# Patient Record
Sex: FEMALE | Race: WHITE | NOT HISPANIC OR LATINO | Employment: FULL TIME | URBAN - METROPOLITAN AREA
[De-identification: names, ages, dates, MRNs, and addresses within clinical notes are randomized per-mention and may not be internally consistent; named-entity substitution may affect disease eponyms.]

---
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
## 2023-02-28 ENCOUNTER — HOSPITAL ENCOUNTER (OUTPATIENT)
Dept: RADIOLOGY | Facility: MEDICAL CENTER | Age: 18
End: 2023-02-28
Attending: PHYSICIAN ASSISTANT
Payer: COMMERCIAL

## 2023-02-28 ENCOUNTER — OCCUPATIONAL MEDICINE (OUTPATIENT)
Dept: URGENT CARE | Facility: PHYSICIAN GROUP | Age: 18
End: 2023-02-28
Payer: COMMERCIAL

## 2023-02-28 VITALS
HEIGHT: 64 IN | RESPIRATION RATE: 16 BRPM | TEMPERATURE: 98.1 F | WEIGHT: 140 LBS | OXYGEN SATURATION: 98 % | DIASTOLIC BLOOD PRESSURE: 64 MMHG | HEART RATE: 86 BPM | SYSTOLIC BLOOD PRESSURE: 100 MMHG | BODY MASS INDEX: 23.9 KG/M2

## 2023-02-28 DIAGNOSIS — M25.531 RIGHT WRIST PAIN: ICD-10-CM

## 2023-02-28 DIAGNOSIS — S60.211A CONTUSION OF RIGHT WRIST, INITIAL ENCOUNTER: ICD-10-CM

## 2023-02-28 PROCEDURE — 73110 X-RAY EXAM OF WRIST: CPT | Mod: RT

## 2023-02-28 PROCEDURE — 99213 OFFICE O/P EST LOW 20 MIN: CPT | Performed by: PHYSICIAN ASSISTANT

## 2023-02-28 RX ORDER — IBUPROFEN 200 MG
600 TABLET ORAL ONCE
Status: COMPLETED | OUTPATIENT
Start: 2023-02-28 | End: 2023-02-28

## 2023-02-28 RX ADMIN — Medication 600 MG: at 17:43

## 2023-02-28 ASSESSMENT — FIBROSIS 4 INDEX: FIB4 SCORE: .3589594320875166869

## 2023-02-28 NOTE — LETTER
[facility] [address] [place] [address]  Phone:  [phone] - Fax:  [phone]   [facility] Progress Report and Disability Certification  Date of Service: 2/28/2023   No Show:  No  Date / Time of Next Visit: 4/4/2023 @8:30   Claim Information   Patient Name: Nancy Rodrigues  Claim Number:     Employer: [email or web address]  Date of Injury: 2/28/2023     Insurer / TPA: Yvette  ID / SSN:     Occupation: [employer]  Diagnosis: The encounter diagnosis was Contusion of right wrist, initial encounter.    Medical Information   Related to Industrial Injury? Yes    Subjective Complaints:  DOI: 2/28/23  Initial visit   NIRMAL: Patient states she was pushing a cart full of supplies and accidentally smashed her right wrist against the cart and  a shelf.  Developed immediate pain afterwards.  There is a small scrape to her wrist.  Pain is worse with movement of her wrist and gripping.  Denies any numbness or tingling. Denies a previous right wrist injury. Denies a second job.    Objective Findings: Constitutional: Well-appearing in no acute distress   Musculoskeletal: Right wrist - full ROM.  There is erythema and localized edema to the dorsal wrist.  Superficial abrasion.  Tenderness to the dorsal wrist.  Negative snuffbox tenderness.  Radial and ulnar pulse 2+.  Sensation intact.  Cap refills less than 2 seconds.   Pre-Existing Condition(s):     Assessment:   Initial Visit    Status: Additional Care Required  Permanent Disability:No    Plan: Diagnostics    Diagnostics: X-ray    Comments:  Plan:  -Wrist brace, elevation, ice application, ibuprofen.  Gentle increased range of motion and gripping exercises as tolerated.  - Work restrictions per D39  -Follow-up in 1 week.      Disability Information   Status: Released to Restricted Duty    From:  2/28/2023  Through: 4/4/2023 Restrictions are: Temporary   Physical Restrictions   Sitting:    Standing:     Stooping:    Bending:      Squatting:    Walking:    Climbing:    Pushing:      Pulling:    Other:    Reaching Above Shoulder (L):   Reaching Above Shoulder (R):       Reaching Below Shoulder (L):    Reaching Below Shoulder (R):      Not to exceed Weight Limits   Carrying(hrs):   Weight Limit(lb):   Lifting(hrs):   Weight  Limit(lb):     Comments: No lifting, gripping, pulling pushing more than 10 lbs with right hand only     Repetitive Actions   Hands: i.e. Fine Manipulations from Grasping:     Feet: i.e. Operating Foot Controls:     Driving / Operate Machinery:     Health Care Provider’s Original or Electronic Signature  Yosi Mondragon P.A.-C. Health Care Provider’s Original or Electronic Signature    Gigi Real DO MPH     Clinic Name / Location: [address] [address] Clinic Phone Number: Dept: [phone]   Appointment Time: 5:05 Pm Visit Start Time: 5:13 PM   Check-In Time:  5:07 Pm Visit Discharge Time:  7:05 PM   Original-Treating Physician or Chiropractor    Page 2-Insurer/TPA    Page 3-Employer    Page 4-Employee

## 2023-02-28 NOTE — LETTER
"EMPLOYEE’S CLAIM FOR COMPENSATION/ REPORT OF INITIAL TREATMENT  FORM C-4    EMPLOYEE’S CLAIM - PROVIDE ALL INFORMATION REQUESTED   First Name  Nancy Last Name  Ravi Birthdate                    2005                Sex  female Claim Number (Insurer’s Use Only)   Home Address  [address]  Age  17 y.o. 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 (if applicable)  pushing a picking cart    How did this injury or occupational disease occur? (Be specific an answer in detail. Use additional sheet if necessary)  I was pushing a caart and wasnt watchign where my hand was while I was moving the cart out of someones way. I ened up smashing my hand/wrist between the cart and shelf   If you believe that you have an occupational disease, when did you first have knowledge of the disability and it relationship to your employment?  n/a  n/a Witnesses to the Accident  n/a      Nature of Injury or Occupational Disease  Crushing  Part(s) of Body Injured or Affected  Wrist (R) and Hand (R), N/A, N/A    I certify that the above is true and " correct to the best of my knowledge and that I have provided this information in order to obtain the benefits of [place] of [place]).  I hereby authorize any physician, chiropractor, surgeon, practitioner, or other person, any hospital, including [facility] or [facility], any medical service organization, any insurance company, or other institution or organization to release to each other, any medical or other information, including benefits paid or payable, pertinent to this injury or disease, except information relative to diagnosis, treatment and/or counseling for AIDS, psychological conditions, alcohol or controlled substances, for which I must give specific authorization.  A Photostat of this authorization shall be as valid as the original.     Date   Place Employee’s Original or  *Electronic Signature   THIS REPORT MUST BE COMPLETED AND MAILED WITHIN 3 WORKING DAYS OF TREATMENT   Place  [facility]  Name of Facility  [facility]   Date  2/28/2023 Diagnosis and Description of Injury or Occupational Disease  (S60.211A) Contusion of right wrist, initial encounter Is there evidence the injured employee was under the influence of alcohol and/or another controlled substance at the time of accident?  ? No ? Yes (if yes, please explain)   Hour  5:13 PM   The encounter diagnosis was Contusion of right wrist, initial encounter. No   Treatment  -Wrist brace, elevation, ice application, ibuprofen.  Gentle increased range of motion and gripping exercises as tolerated.  - Work restrictions per D39  -Follow-up in 1 week.    Have you advised the patient to remain off work five days or     more?    X-Ray Findings      ? Yes Indicate dates:   From   To      From information given by the employee, together with medical evidence, can        you directly connect this injury or occupational disease as  "job incurred?  Yes ? No If no, is the injured employee capable of:  ? full duty  No ? modified duty  Yes   Is additional medical care by a physician indicated?  Yes If Modified Duty, Specify any Limitations / Restrictions  Any duty following work restrictions per D39    Do you know of any previous injury or disease contributing to this condition or occupational disease?  ? Yes ? No (Explain if yes)                          No   Date  2/28/2023 Print Health Care Provider's   Ministerio Mondragon P.A.-C. I certify the employer’s copy of  this form was mailed on:   Address  [address]  [facility] Insurer’s Use Only     [facility]  [address]    Provider’s Tax ID Number  [id number] Telephone  Dept: [phone]             Health Care Provider’s Original or Electronic Signature  e-MINISTERIO Qureshi P.A.-C. Degree (MD,DO, DC,PAHeberC,APRN)        * Complete and attach Release of Information (Form C-4A) when injured employee signs C-4 Form electronically  ORIGINAL - TREATING HEALTHCARE PROVIDER PAGE 2 - INSURER/TPA PAGE 3 - EMPLOYER PAGE 4 - EMPLOYEE             Form C-4 (rev.08/21)           BRIEF DESCRIPTION OF RIGHTS AND BENEFITS  (Pursuant to [place] 616C.050)    Notice of Injury or Occupational Disease (Incident Report Form C-1): If an injury or occupational disease (OD) arises out of and in the course of employment, you must provide written notice to your employer as soon as practicable, but no later than 7 days after the accident or OD. Your employer shall maintain a sufficient supply of the required forms.    Claim for Compensation (Form C-4): If medical treatment is sought, the form C-4 is available at the place of initial treatment. A completed \"Claim for Compensation\" (Form C-4) must be filed within 90 days after an accident or OD. The treating physician or chiropractor must, within 3 working days after treatment, complete and mail to the employer, the employer's insurer and third-party , the " Claim for Compensation.    Medical Treatment: If you require medical treatment for your on-the-job injury or OD, you may be required to select a physician or chiropractor from a list provided by your workers’ compensation insurer, if it has contracted with an Organization for Managed Care (MCO) or Preferred Provider Organization (PPO) or providers of health care. If your employer has not entered into a contract with an MCO or PPO, you may select a physician or chiropractor from the Panel of Physicians and Chiropractors. Any medical costs related to your industrial injury or OD will be paid by your insurer.    Temporary Total Disability (TTD): If your doctor has certified that you are unable to work for a period of at least 5 consecutive days, or 5 cumulative days in a 20-day period, or places restrictions on you that your employer does not accommodate, you may be entitled to TTD compensation.    Temporary Partial Disability (TPD): If the wage you receive upon reemployment is less than the compensation for TTD to which you are entitled, the insurer may be required to pay you TPD compensation to make up the difference. TPD can only be paid for a maximum of 24 months.    Permanent Partial Disability (PPD): When your medical condition is stable and there is an indication of a PPD as a result of your injury or OD, within 30 days, your insurer must arrange for an evaluation by a rating physician or chiropractor to determine the degree of your PPD. The amount of your PPD award depends on the date of injury, the results of the PPD evaluation, your age and wage.    Permanent Total Disability (PTD): If you are medically certified by a treating physician or chiropractor as permanently and totally disabled and have been granted a PTD status by your insurer, you are entitled to receive monthly benefits not to exceed 66 2/3% of your average monthly wage. The amount of your PTD payments is subject to reduction if you previously  received a lump-sum PPD award.    Vocational Rehabilitation Services: You may be eligible for vocational rehabilitation services if you are unable to return to the job due to a permanent physical impairment or permanent restrictions as a result of your injury or occupational disease.    Transportation and Per Javier Reimbursement: You may be eligible for travel expenses and per javier associated with medical treatment.    Reopening: You may be able to reopen your claim if your condition worsens after claim closure.     Appeal Process: If you disagree with a written determination issued by the insurer or the insurer does not respond to your request, you may appeal to the Department of Administration, , by following the instructions contained in your determination letter. You must appeal the determination within 70 days from the date of the determination letter at [address], [address], [place], [place] [address], or [address], [address], [place] [address]. If you disagree with the  decision, you may appeal to the Department of Administration, . You must file your appeal within 30 days from the date of the  decision letter at [address], [address], [place], [place] [address], or [address], [address], [place], [place] [address]. If you disagree with a decision of an , you may file a petition for judicial review with the District Court. You must do so within 30 days of the Appeal Officer’s decision. You may be represented by an  at your own expense or you may contact the [facility] for possible representation.    [place]  for Injured Workers (NAIW): If you disagree with a  decision, you may request that NAIW represent you without charge at an  Hearing. For information regarding denial of benefits, you may contact the [facility] at: [address], [address],  [place] [address], [phone], or [address], [address], [place] [address], [phone]    To File a Complaint with the Division: If you wish to file a complaint with the  of the Division of Industrial Relations (DIR),  please contact the Workers’ Compensation Section, [address], [address], [place], [place] [address], telephone [phone], or [address], [address], [place], [place] [address], telephone [phone]) [phone].    For assistance with Workers’ Compensation Issues: You may contact the [facility] Office for Consumer Health Assistance, [address], [facility] [address], [place] [address], Toll Free 1-655.122.5166, Web site: [email or web address] E-mail: [email or web address]              __________________________________________________________________                                    _________________            Employee Name / Signature                                                                                                                            Date                                                                                                                                                                                                                              D-2 (rev. 10/20)

## 2023-03-01 NOTE — PROGRESS NOTES
"Subjective:     Nancy Rodrigues is a 17 y.o. female who presents for Wrist Injury (NEW WC DOI 2/28/2023 (R) wrist)      DOI: 2/28/23  Initial visit   NIRMAL: Patient states she was pushing a cart full of supplies and accidentally smashed her right wrist against the cart and  a shelf.  Developed immediate pain afterwards.  There is a small scrape to her wrist.  Pain is worse with movement of her wrist and gripping.  Denies any numbness or tingling. Denies a previous right wrist injury. Denies a second job.     PMH:   No pertinent past medical history to this problem  MEDS:  Medications were reviewed in EMR  ALLERGIES:  Allergies were reviewed in EMR  SOCHX:  Works as a Production [employer]   FH:   No pertinent family history to this problem       Objective:     /64 (BP Location: Left arm, Patient Position: Sitting, BP Cuff Size: Adult)   Pulse 86   Temp 36.7 °C (98.1 °F) (Temporal)   Resp 16   Ht 1.626 m (5' 4\")   Wt 63.5 kg (140 lb)   SpO2 98%   BMI 24.03 kg/m²     Constitutional: Well-appearing in no acute distress   Musculoskeletal: Right wrist - full ROM.  There is erythema and localized edema to the dorsal wrist.  Superficial abrasion.  Tenderness to the dorsal wrist.  Negative snuffbox tenderness.  Radial and ulnar pulse 2+.  Sensation intact.  Cap refills less than 2 seconds.      RADIOLOGY RESULTS   DX-WRIST-COMPLETE 3+ RIGHT    Result Date: 2/28/2023 2/28/2023 5:49 PM HISTORY/REASON FOR EXAM:  Pain/Deformity Following Trauma. Erythema and swelling after crush injury TECHNIQUE/EXAM DESCRIPTION AND NUMBER OF VIEWS:  4 views of the RIGHT wrist. COMPARISON: None FINDINGS: There is some soft tissue swelling without abnormal gas or foreign body No acute fracture or subluxation is seen. Carpal relationships are normal Ulnar positive variance without evidence of lunate injury     No radiographic evidence of acute bony injury. Ulnar positive variance           Assessment/Plan:       1. Contusion of right wrist, " initial encounter  - DX-WRIST-COMPLETE 3+ RIGHT; Future  - ibuprofen (MOTRIN) tablet 600 mg    Released to Restricted Duty FROM 2/28/2023 TO 4/4/2023  No lifting, gripping, pulling pushing more than 10 lbs with right hand only   Plan:  -Wrist brace, elevation, ice application, ibuprofen.  Gentle increased range of motion and gripping exercises as tolerated.  - Work restrictions per D39  -Follow-up in 1 week.    X-ray results per radiologist interpretation above. I personally reviewed images and radiologist report   Differential diagnosis, natural history, supportive care, and indications for immediate follow-up discussed.

## 2023-03-04 ENCOUNTER — OCCUPATIONAL MEDICINE (OUTPATIENT)
Dept: URGENT CARE | Facility: PHYSICIAN GROUP | Age: 18
End: 2023-03-04
Payer: COMMERCIAL

## 2023-03-04 VITALS
OXYGEN SATURATION: 96 % | DIASTOLIC BLOOD PRESSURE: 68 MMHG | SYSTOLIC BLOOD PRESSURE: 110 MMHG | BODY MASS INDEX: 24.37 KG/M2 | HEART RATE: 90 BPM | TEMPERATURE: 97.6 F | RESPIRATION RATE: 16 BRPM | WEIGHT: 142 LBS

## 2023-03-04 DIAGNOSIS — S60.211D CONTUSION OF RIGHT WRIST, SUBSEQUENT ENCOUNTER: ICD-10-CM

## 2023-03-04 PROCEDURE — 99213 OFFICE O/P EST LOW 20 MIN: CPT | Performed by: NURSE PRACTITIONER

## 2023-03-04 ASSESSMENT — FIBROSIS 4 INDEX: FIB4 SCORE: .3589594320875166869

## 2023-03-04 ASSESSMENT — ENCOUNTER SYMPTOMS
FALLS: 0
SENSORY CHANGE: 0
MYALGIAS: 1
TINGLING: 0

## 2023-03-04 NOTE — LETTER
[facility] [address] [address]  Phone:  [phone] - Fax:  [phone]   [facility] Progress Report and Disability Certification  Date of Service: 3/4/2023   No Show:  No  Date / Time of Next Visit: 3/11/2023   Claim Information   Patient Name: Nancy Rodrigues  Claim Number:     Employer: [employer]  Date of Injury: 2/28/2023     Insurer / TPA: [employer]  ID / SSN:     Occupation: Production II  Diagnosis: The encounter diagnosis was Contusion of right wrist, subsequent encounter.    Medical Information   Related to Industrial Injury? Yes    Subjective Complaints:  DOI: 2/28/23  Initial visit   NIRMAL: Patient states she was pushing a cart full of supplies and accidentally smashed her right wrist against the cart and  a shelf.  Developed immediate pain afterwards.  There is a small scrape to her right wrist.  Pain is worse with movement of her wrist and gripping.  Denies any numbness or tingling. Denies a previous right wrist injury. Denies a second job.     Today, 3/4/2023, 2nd encounter. States mild improvement in right wrist pain.  Has been using wrist splint at work always.  Having discomfort at dorsal aspect of mid wrist joint with movement still.  Abrasion did bleed last night but has covered with a Band-Aid.  Taking ibuprofen as needed for pain.  No ice as needed as well.  No noted numbness/tingling or weakness in her strength.  Performing modified duties with no problems.   Objective Findings: A/O x 3, skin p/w/d, skin sensation intact. Vitals WNL.  Full range of motion with flexion and extension of right wrist joint with mild discomfort at dorsal aspect.  No swelling, erythema, bruising, deformity at wrist joint.  Abrasion at mid dorsal aspect wrist joint without active bleeding, swelling or drainage from site.  Tenderness on palpation at this region as well.   Pre-Existing Condition(s):     Assessment:   Condition  Improved    Status: Additional Care Required  Permanent Disability:No    Plan:      Diagnostics:      Comments:       Disability Information   Status: Released to Restricted Duty    From:  3/4/2023  Through: 3/11/2023 Restrictions are:     Physical Restrictions   Sitting:    Standing:    Stooping:    Bending:      Squatting:    Walking:    Climbing:    Pushing:      Pulling:    Other:    Reaching Above Shoulder (L):   Reaching Above Shoulder (R):       Reaching Below Shoulder (L):    Reaching Below Shoulder (R):      Not to exceed Weight Limits   Carrying(hrs):   Weight Limit(lb):   Lifting(hrs):   Weight  Limit(lb):     Comments: -We will continue with current job restrictions of no lifting, gripping, pulling, pushing > 10 pounds  -Must use Velcro wrist splint always at work  -May continue with ibuprofen as needed for pain  -May use cool compress for any throbbing pain or swelling as needed  -May use warm compress for any joint stiffness as needed  -May continue to do gentle range of motion exercises to prevent joint stiffness as tolerated  -Recheck in 1 week      Repetitive Actions   Hands: i.e. Fine Manipulations from Grasping:     Feet: i.e. Operating Foot Controls:     Driving / Operate Machinery:     Health Care Provider’s Original or Electronic Signature  ASIF Null. Health Care Provider’s Original or Electronic Signature    Gigi Rael DO MPH     Clinic Name / Location: [address] [address] Clinic Phone Number: Dept: [phone]   Appointment Time: 9:15 Am Visit Start Time: 9:57 AM   Check-In Time:  9:35 Am Visit Discharge Time:     Original-Treating Physician or Chiropractor    Page 2-Insurer/TPA    Page 3-Employer    Page 4-Employee

## 2023-03-04 NOTE — PROGRESS NOTES
Subjective     Nancy Rodrigues is a 17 y.o. female who presents with Follow-Up (Workman's comp)      DOI: 2/28/23  Initial visit   NIRMAL: Patient states she was pushing a cart full of supplies and accidentally smashed her right wrist against the cart and  a shelf.  Developed immediate pain afterwards.  There is a small scrape to her right wrist.  Pain is worse with movement of her wrist and gripping.  Denies any numbness or tingling. Denies a previous right wrist injury. Denies a second job.     Today, 3/4/2023, 2nd encounter. States mild improvement in right wrist pain.  Has been using wrist splint at work always.  Having discomfort at dorsal aspect of mid wrist joint with movement still.  Abrasion did bleed last night but has covered with a Band-Aid.  Taking ibuprofen as needed for pain.  No ice as needed as well.  No noted numbness/tingling or weakness in her strength.  Performing modified duties with no problems.     HPI  PMH: No pertinent past medical history to this problem  MEDS: Medications were reviewed in Epic  ALLERGIES: Allergies were reviewed in Epic  FH: No pertinent family history to this problem       Review of Systems   Musculoskeletal:  Positive for joint pain and myalgias. Negative for falls.   Neurological:  Negative for tingling and sensory change.   All other systems reviewed and are negative.           Objective     /68 (BP Location: Left arm, Patient Position: Sitting, BP Cuff Size: Adult)   Pulse 90   Temp 36.4 °C (97.6 °F) (Temporal)   Resp 16   Wt 64.4 kg (142 lb)   SpO2 96%   BMI 24.37 kg/m²      Physical Exam  Vitals reviewed.   Constitutional:       General: She is awake. She is not in acute distress.     Appearance: Normal appearance. She is well-developed. She is not ill-appearing, toxic-appearing or diaphoretic.   Cardiovascular:      Rate and Rhythm: Normal rate.   Pulmonary:      Effort: Pulmonary effort is normal.   Musculoskeletal:      Right forearm: Normal.       Right wrist: Tenderness present. No swelling, deformity, effusion, lacerations, bony tenderness, snuff box tenderness or crepitus. Normal range of motion. Normal pulse.      Right hand: Normal.   Skin:     General: Skin is warm and dry.      Findings: Abrasion and erythema present.   Neurological:      Mental Status: She is alert and oriented to person, place, and time.   Psychiatric:         Attention and Perception: Attention normal.         Mood and Affect: Mood normal.         Speech: Speech normal.         Behavior: Behavior normal. Behavior is cooperative.       A/O x 3, skin p/w/d, skin sensation intact. Vitals WNL.  Full range of motion with flexion and extension of right wrist joint with mild discomfort at dorsal aspect.  No swelling, erythema, bruising, deformity at wrist joint.  Abrasion at mid dorsal aspect wrist joint without active bleeding, swelling or drainage from site.  Tenderness on palpation at this region as well.                   Assessment & Plan        1. Contusion of right wrist, subsequent encounter       -We will continue with current job restrictions of no lifting, gripping, pulling, pushing > 10 pounds   -Must use Velcro wrist splint always at work   -May continue with ibuprofen as needed for pain   -May use cool compress for any throbbing pain or swelling as needed   -May use warm compress for any joint stiffness as needed   -May continue to do gentle range of motion exercises to prevent joint stiffness as tolerated   -Recheck in 1 week

## 2023-03-07 ENCOUNTER — OCCUPATIONAL MEDICINE (OUTPATIENT)
Dept: URGENT CARE | Facility: PHYSICIAN GROUP | Age: 18
End: 2023-03-07
Payer: COMMERCIAL

## 2023-03-07 VITALS
WEIGHT: 142 LBS | RESPIRATION RATE: 18 BRPM | HEIGHT: 64 IN | SYSTOLIC BLOOD PRESSURE: 96 MMHG | BODY MASS INDEX: 24.24 KG/M2 | OXYGEN SATURATION: 96 % | DIASTOLIC BLOOD PRESSURE: 60 MMHG | TEMPERATURE: 97.8 F | HEART RATE: 94 BPM

## 2023-03-07 DIAGNOSIS — S60.211D CONTUSION OF RIGHT WRIST, SUBSEQUENT ENCOUNTER: ICD-10-CM

## 2023-03-07 DIAGNOSIS — Z02.6 ENCOUNTER RELATED TO WORKER'S COMPENSATION CLAIM: ICD-10-CM

## 2023-03-07 PROCEDURE — 99213 OFFICE O/P EST LOW 20 MIN: CPT | Performed by: PHYSICIAN ASSISTANT

## 2023-03-07 ASSESSMENT — FIBROSIS 4 INDEX: FIB4 SCORE: .3589594320875166869

## 2023-03-07 NOTE — LETTER
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    Medical Information   Related to Industrial Injury? Yes    Subjective Complaints:  DOI: 2/28/23  Initial visit   NIRMAL: Patient states she was pushing a cart full of supplies and accidentally smashed her right wrist against the cart and  a shelf.  Developed immediate pain afterwards.  There is a small scrape to her right wrist.  Pain is worse with movement of her wrist and gripping.  Denies any numbness or tingling. Denies a previous right wrist injury. Denies a second job.      Update 3/7/2023: Third encounter. Intermittently painful at times with associated swelling. States only mild improvement in right wrist pain.  Not tolerating modified duty.  Does get swollen with use and also with rest.  During times of swelling wrist range of motion is diminished.  Has been using wrist splint at work always.  Having discomfort at dorsal aspect of mid wrist joint with movement still.  Taking ibuprofen as needed for pain.  Using ice as needed.     Objective Findings: A/O x 3, skin p/w/d, skin sensation intact. Vitals WNL.  Full range of motion with flexion and extension of right wrist joint with mild discomfort at dorsal aspect at DRUJ.  Mild swelling, no erythema, or bruising, no deformity at wrist joint.  Abrasion at mid dorsal aspect wrist joint without active bleeding, swelling or  drainage from site.  Tenderness on palpation at this region as well.  [place]i.   Pre-Existing Condition(s):     Assessment:   Condition Worsened    Status: Discharged / Care Transfer  Permanent Disability:No    Plan: MedicationTransfer Care  Comments:Ibuprofen prn swelling    Diagnostics:      Comments:    RADIOLOGY RESULTS  DX-WRIST-COMPLETE 3+ RIGHT    Result Date: 2023 5:49 PM HISTORY/REASON FOR EXAM:  Pain/Deformity Following Trauma. Erythema and swelling after crush injury TECHNIQUE/EXAM DESCRIPTION AND NUMBER OF VIEWS:  4 views of the RIGHT wrist. COMPARISON: None FINDINGS: There is some soft tissue swelling without abnormal gas or foreign body No acute fracture or subluxation is seen. Carpal relationships are normal Ulnar positive variance without evidence of lunate injury     No radiographic evidence of acute bony injury. Ulnar positive variance              Disability Information   Status: Released to Restricted Duty    From:  3/7/2023  Through: 3/14/2023 Restrictions are: Temporary   Physical Restrictions   Sitting:    Standing:    Stooping:    Bending:      Squatting:    Walking:    Climbing:    Pushin hrs/day  Comments:RUE   Pullin hrs/day  Comments:RUE Other:    Reaching Above Shoulder (L):   Reaching Above Shoulder (R):       Reaching Below Shoulder (L):    Reaching Below Shoulder (R):      Not to exceed Weight Limits   Carrying(hrs):   Weight Limit(lb):   Lifting(hrs):   Weight  Limit(lb):     Comments: -May not use right hand while at work for any job duties.  No lifting, gripping, pulling, pushing with RUE.  -Must use Velcro wrist splint always at work   -May continue with ibuprofen as needed for pain   -May use cool compress for any throbbing pain or swelling as needed   -May use warm compress for any joint stiffness as needed   -May continue to do gentle range of motion exercises to prevent joint stiffness as tolerated   -Transfer care to occupational medicine for further  evaluation    Repetitive Actions   Hands: i.e. Fine Manipulations from Graspin hrs/day  Comments:RUE   Feet: i.e. Operating Foot Controls:     Driving / Operate Machinery:     Health Care Provider’s Original or Electronic Signature  Misty Winter P.A.-C. Health Care Provider’s Original or Electronic Signature    Gigi Real DO MPH     Clinic Name / Location: [address] [address] Clinic Phone Number: Dept: [phone]   Appointment Time: 12:00 Pm Visit Start Time: 12:05 PM   Check-In Time:  11:57 Am Visit Discharge Time:     Original-Treating Physician or Chiropractor    Page 2-Insurer/TPA    Page 3-Employer    Page 4-Employee

## 2023-03-07 NOTE — PROGRESS NOTES
"Subjective     Nancy Rodrigues is a 17 y.o. female who presents with Wrist Injury ( follow up 1/R wrist injury/Pain and swelling worsened last night, scheduled appt earlier per employer/)      DOI: 2/28/23  Initial visit   NIRMAL: Patient states she was pushing a cart full of supplies and accidentally smashed her right wrist against the cart and  a shelf.  Developed immediate pain afterwards.  There is a small scrape to her right wrist.  Pain is worse with movement of her wrist and gripping.  Denies any numbness or tingling. Denies a previous right wrist injury. Denies a second job.      Update 3/7/2023: Third encounter. Intermittently painful at times with associated swelling. States only mild improvement in right wrist pain.  Not tolerating modified duty.  Does get swollen with use and also with rest.  During times of swelling wrist range of motion is diminished.  Has been using wrist splint at work always.  Having discomfort at dorsal aspect of mid wrist joint with movement still.  Taking ibuprofen as needed for pain.  Using ice as needed.                Objective     BP 96/60 (BP Location: Right arm, Patient Position: Sitting, BP Cuff Size: Adult long)   Pulse 94   Temp 36.6 °C (97.8 °F) (Temporal)   Resp 18   Ht 1.626 m (5' 4\")   Wt 64.4 kg (142 lb)   SpO2 96%   BMI 24.37 kg/m²      Physical Exam  Vitals and nursing note reviewed.   Constitutional:       General: She is not in acute distress.     Appearance: Normal appearance. She is not ill-appearing, toxic-appearing or diaphoretic.   HENT:      Head: Normocephalic.      Right Ear: External ear normal.      Left Ear: External ear normal.      Nose: Nose normal.      Mouth/Throat:      Mouth: Mucous membranes are moist.   Eyes:      Extraocular Movements: Extraocular movements intact.      Conjunctiva/sclera: Conjunctivae normal.      Pupils: Pupils are equal, round, and reactive to light.   Cardiovascular:      Rate and Rhythm: Normal rate.      " Pulses: Normal pulses.   Pulmonary:      Effort: Pulmonary effort is normal. No respiratory distress.   Musculoskeletal:      Cervical back: Normal range of motion.   Skin:     General: Skin is warm.      Findings: No lesion or rash.   Neurological:      General: No focal deficit present.      Mental Status: She is alert and oriented to person, place, and time.   Psychiatric:         Thought Content: Thought content normal.         Judgment: Judgment normal.       A/O x 3, skin p/w/d, skin sensation intact. Vitals WNL.  Full range of motion with flexion and extension of right wrist joint with mild discomfort at dorsal aspect.  No swelling, erythema, bruising, deformity at wrist joint.  Abrasion at mid dorsal aspect wrist joint without active bleeding, swelling or drainage from site.  Tenderness on palpation at this region as well.                   Assessment & Plan        1. Contusion of right wrist, subsequent encounter    - Referral to Occupational Medicine    2. Encounter related to worker's compensation claim    - Referral to Occupational Medicine     -May not use right hand while at work for any job duties.  No lifting, gripping, pulling, pushing with RUE.  -Must use Velcro wrist splint always at work   -May continue with ibuprofen as needed for pain   -May use cool compress for any throbbing pain or swelling as needed   -May use warm compress for any joint stiffness as needed   -May continue to do gentle range of motion exercises to prevent joint stiffness as tolerated   -Transfer care to occupational medicine for further evaluation

## 2023-03-14 ENCOUNTER — OCCUPATIONAL MEDICINE (OUTPATIENT)
Dept: OCCUPATIONAL MEDICINE | Facility: CLINIC | Age: 18
End: 2023-03-14
Payer: COMMERCIAL

## 2023-03-14 VITALS
BODY MASS INDEX: 24.24 KG/M2 | WEIGHT: 142 LBS | SYSTOLIC BLOOD PRESSURE: 128 MMHG | OXYGEN SATURATION: 100 % | RESPIRATION RATE: 14 BRPM | TEMPERATURE: 98.6 F | DIASTOLIC BLOOD PRESSURE: 84 MMHG | HEART RATE: 110 BPM | HEIGHT: 64 IN

## 2023-03-14 DIAGNOSIS — S60.211D CONTUSION OF RIGHT WRIST, SUBSEQUENT ENCOUNTER: ICD-10-CM

## 2023-03-14 PROCEDURE — 99213 OFFICE O/P EST LOW 20 MIN: CPT | Performed by: NURSE PRACTITIONER

## 2023-03-14 ASSESSMENT — FIBROSIS 4 INDEX: FIB4 SCORE: .3589594320875166869

## 2023-03-14 NOTE — LETTER
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    Medical Information   Related to Industrial Injury? Yes    Subjective Complaints:  DOI: 2/28/23. NIRMAL: Patient states she was pushing a cart full of supplies and accidentally smashed her right wrist against the cart and  a shelf.  Patient states that symptoms have improved. She has noticed increase swelling from picking up trash at work causing her to ice while she is at work, otherwise no new or worsening symptoms. She states that there is soreness in the joint with flexion. She denies numbness, tingling, or weakness. She has been taking Tylenol once a day if needed. She is using her wrist splint at work and doing stretching exercises which seem to be helping. She has been able to tolerate light duty. Hand therapy referral placed. Plan of care discussed with patient.    Objective Findings: Right wrist: FROM with flexion and extension of right wrist joint with mild discomfort at dorsal aspect.  No swelling, erythema, bruising, deformity at wrist joint.  Well-healed abrasion at mid dorsal aspect wrist joint no swelling, abnormal warmth, or drainage from site.  Mild tenderness on palpation at this region as well. Neg pain with pronation or supination.    Pre-Existing Condition(s):     Assessment:   Condition Improved    Status: Additional Care Required  Permanent Disability:No    Plan: OT    Diagnostics:      Comments:  Follow-up in 2  weeks  Work restrictions  Hand therapy referral placed  Continue with OTC Tylenol/Ibuprofen, Epson salt soaks, and OTC topical ointments ie Bio-Freeze, Tiger-Balm, or Voltaren Gel  Begin weaning the wrist brace   Continue RICE         Disability Information   Status: Released to Restricted Duty    From:  3/14/2023  Through: 3/28/2023 Restrictions are: Temporary   Physical Restrictions   Sitting:    Standing:    Stooping:    Bending:      Squatting:    Walking:    Climbing:    Pushing:  < or = to 1 hr/day   Pulling:  < or = to 1 hr/day Other:    Reaching Above Shoulder (L):   Reaching Above Shoulder (R):       Reaching Below Shoulder (L):    Reaching Below Shoulder (R):      Not to exceed Weight Limits   Carrying(hrs):   Weight Limit(lb): < or = to 10 pounds Lifting(hrs):   Weight  Limit(lb): < or = to 10 pounds   Comments: No pushing, pulling, or lifting more than 10lbs right extremity only. Recommend use of wrist splint if needed, otherwise begin weaning    Repetitive Actions   Hands: i.e. Fine Manipulations from Grasping: < or = to 1 hr/day   Feet: i.e. Operating Foot Controls:     Driving / Operate Machinery:     Health Care Provider’s Original or Electronic Signature  MODESTA Haley Health Care Provider’s Original or Electronic Signature    Gigi Real DO MPH     Clinic Name / Location: [address],   [address]  [place] [address] Clinic Phone Number: Dept: [phone]   Appointment Time: 3:30 Pm Visit Start Time: 3:27 PM   Check-In Time:  3:23 Pm Visit Discharge Time:  3:51 PM   Original-Treating Physician or Chiropractor    Page 2-Insurer/TPA    Page 3-Employer    Page 4-Employee

## 2023-03-14 NOTE — PROGRESS NOTES
"Subjective:     Nancy Rodrigues is a 17 y.o. female who presents for Follow-Up (BETTER - RM 16/)      DOI: 2/28/23. NIRMAL: Patient states she was pushing a cart full of supplies and accidentally smashed her right wrist against the cart and  a shelf.  Patient states that symptoms have improved. She has noticed increase swelling from picking up trash at work causing her to ice while she is at work, otherwise no new or worsening symptoms. She states that there is soreness in the joint with flexion. She denies numbness, tingling, or weakness. She has been taking Tylenol once a day if needed. She is using her wrist splint at work and doing stretching exercises which seem to be helping. She has been able to tolerate light duty. Hand therapy referral placed. Plan of care discussed with patient.     ROS: All systems were reviewed on intake form, form was reviewed and signed. See scanned documents in media. Pertinent positives and negatives included in HPI.    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   FH: No pertinent family history to this problem       Objective:     /84   Pulse (!) 110   Temp 37 °C (98.6 °F) (Temporal)   Resp 14   Ht 1.626 m (5' 4\")   Wt 64.4 kg (142 lb)   SpO2 100%   BMI 24.37 kg/m²     [unfilled]    Right wrist: FROM with flexion and extension of right wrist joint with mild discomfort at dorsal aspect.  No swelling, erythema, bruising, deformity at wrist joint.  Well-healed abrasion at mid dorsal aspect wrist joint no swelling, abnormal warmth, or drainage from site.  Mild tenderness on palpation at this region as well. Neg pain with pronation or supination.     Assessment/Plan:       1. Contusion of right wrist, subsequent encounter    Released to Restricted Duty FROM 3/14/2023 " TO 3/28/2023  No pushing, pulling, or lifting more than 10lbs right extremity only. Recommend use of wrist splint if needed, otherwise begin weaning  Follow-up in 2 weeks  Work restrictions  Hand therapy referral placed  Continue with OTC Tylenol/Ibuprofen, Epson salt soaks, and OTC topical ointments ie Bio-Freeze, Tiger-Balm, or Voltaren Gel  Begin weaning the wrist brace   Continue RICE         Differential diagnosis, natural history, supportive care, and indications for immediate follow-up discussed.    Approximately 25 minutes were spent in reviewing notes, preparing for visit, obtaining history, exam and evaluation, patient counseling/education and post visit documentation/orders.

## 2023-03-23 ENCOUNTER — OFFICE VISIT (OUTPATIENT)
Dept: MEDICAL GROUP | Facility: PHYSICIAN GROUP | Age: 18
End: 2023-03-23
Payer: COMMERCIAL

## 2023-03-23 VITALS
SYSTOLIC BLOOD PRESSURE: 114 MMHG | RESPIRATION RATE: 18 BRPM | HEIGHT: 64 IN | WEIGHT: 142 LBS | TEMPERATURE: 98.7 F | HEART RATE: 87 BPM | BODY MASS INDEX: 24.24 KG/M2 | OXYGEN SATURATION: 98 % | DIASTOLIC BLOOD PRESSURE: 60 MMHG

## 2023-03-23 DIAGNOSIS — F51.01 PRIMARY INSOMNIA: ICD-10-CM

## 2023-03-23 DIAGNOSIS — F32.81 PMDD (PREMENSTRUAL DYSPHORIC DISORDER): ICD-10-CM

## 2023-03-23 DIAGNOSIS — F32.1 MODERATE MAJOR DEPRESSION (HCC): ICD-10-CM

## 2023-03-23 PROCEDURE — 99214 OFFICE O/P EST MOD 30 MIN: CPT

## 2023-03-23 PROCEDURE — 96127 BRIEF EMOTIONAL/BEHAV ASSMT: CPT

## 2023-03-23 ASSESSMENT — PATIENT HEALTH QUESTIONNAIRE - PHQ9
CLINICAL INTERPRETATION OF PHQ2 SCORE: 5
5. POOR APPETITE OR OVEREATING: 3 - NEARLY EVERY DAY
SUM OF ALL RESPONSES TO PHQ QUESTIONS 1-9: 23

## 2023-03-23 ASSESSMENT — ANXIETY QUESTIONNAIRES
6. BECOMING EASILY ANNOYED OR IRRITABLE: MORE THAN HALF THE DAYS
3. WORRYING TOO MUCH ABOUT DIFFERENT THINGS: MORE THAN HALF THE DAYS
7. FEELING AFRAID AS IF SOMETHING AWFUL MIGHT HAPPEN: SEVERAL DAYS
1. FEELING NERVOUS, ANXIOUS, OR ON EDGE: SEVERAL DAYS
4. TROUBLE RELAXING: MORE THAN HALF THE DAYS
GAD7 TOTAL SCORE: 12
2. NOT BEING ABLE TO STOP OR CONTROL WORRYING: MORE THAN HALF THE DAYS
5. BEING SO RESTLESS THAT IT IS HARD TO SIT STILL: MORE THAN HALF THE DAYS

## 2023-03-23 ASSESSMENT — FIBROSIS 4 INDEX: FIB4 SCORE: .3589594320875166869

## 2023-03-23 NOTE — ASSESSMENT & PLAN NOTE
Patient reports that she did not end up trying the doxylamine. She has only been sleeping about 4 hours at night. She has a hard time falling asleep.

## 2023-03-23 NOTE — LETTER
March 23, 2023    To Whom It May Concern:         This is confirmation that Nancy Rodrigues attended her scheduled appointment with MODESTA Wang on 3/23/23. Please excuse her from work today 3/23 due her medical illness.          If you have any questions please do not hesitate to call me at the phone number listed below.    Sincerely,          ASIF Wang.  [phone]

## 2023-03-23 NOTE — ASSESSMENT & PLAN NOTE
Patient was rotated over to Ortho-Cyclen birth control for management of her menstrual cycle as well as menstrual cramping.  She has now been on Ortho-Cyclen for 6 months and reports that she is continuing to have pain and discomfort. She is still having continued pain that is lasting throughout the month and happening at various times during the month. It is not unilateral, she rates this a 7/10. This is describes as a stabbing sensation. She tries to take tylenol but this is not helpful.

## 2023-03-23 NOTE — ASSESSMENT & PLAN NOTE
Patient reports that she finished the bottle of zoloft, but did not follow up and did not get a refill.  She does ultimately feel like the Zoloft was more helpful.  Patient does admit to frequent thoughts of self-harm and suicide.  She does not have a plan and does not intend to follow through on this.  She cites her family and friends as a major source behind not wanting to follow through.  She has tried online therapy before, but found this to be impersonal and is interested in establishing with an in person counselor.

## 2023-03-23 NOTE — PROGRESS NOTES
"CC:   Chief Complaint   Patient presents with    Dysmenorrhea     Period cramps, Birth control not helping, unable to sleep last night, 1 year     Follow-Up     Depression and Insomnia         HISTORY OF PRESENT ILLNESS: Patient is a 17 y.o. female established patient who presents today to discuss the following problems below:     PMDD (premenstrual dysphoric disorder)  Patient was rotated over to Ortho-Cyclen birth control for management of her menstrual cycle as well as menstrual cramping.  She has now been on Ortho-Cyclen for 6 months and reports that she is continuing to have pain and discomfort. She is still having continued pain that is lasting throughout the month and happening at various times during the month. It is not unilateral, she rates this a 7/10. This is describes as a stabbing sensation. She tries to take tylenol but this is not helpful.     Moderate major depression (HCC)  Patient reports that she finished the bottle of zoloft, but did not follow up and did not get a refill.  She does ultimately feel like the Zoloft was more helpful.  Patient does admit to frequent thoughts of self-harm and suicide.  She does not have a plan and does not intend to follow through on this.  She cites her family and friends as a major source behind not wanting to follow through.  She has tried online therapy before, but found this to be impersonal and is interested in establishing with an in person counselor.    Primary insomnia  Patient reports that she did not end up trying the doxylamine. She has only been sleeping about 4 hours at night. She has a hard time falling asleep.     Review of Systems: Otherwise negative except for as stated above.      Exam: /60   Pulse 87   Temp 37.1 °C (98.7 °F) (Temporal)   Resp 18   Ht 1.626 m (5' 4\")   Wt 64.4 kg (142 lb)   SpO2 98%  Body mass index is 24.37 kg/m².    Physical Exam  Constitutional:       Appearance: Normal appearance.   Cardiovascular:      Rate and " Rhythm: Normal rate and regular rhythm.   Pulmonary:      Effort: Pulmonary effort is normal.   Musculoskeletal:      Cervical back: Normal range of motion and neck supple.   Lymphadenopathy:      Cervical: No cervical adenopathy.   Skin:     General: Skin is warm and dry.   Neurological:      General: No focal deficit present.      Mental Status: She is alert and oriented to person, place, and time.   Psychiatric:         Mood and Affect: Mood normal.         Behavior: Behavior normal.     Depression Screening    Little interest or pleasure in doing things?  2 - more than half the days   Feeling down, depressed , or hopeless? 3 - nearly every day   Trouble falling or staying asleep, or sleeping too much?  3 - nearly every day   Feeling tired or having little energy?  3 - nearly every day   Poor appetite or overeating?  3 - nearly every day   Feeling bad about yourself - or that you are a failure or have let yourself or your family down? 3 - nearly every day   Trouble concentrating on things, such as reading the newspaper or watching television? 2 - more than half the days   Moving or speaking so slowly that other people could have noticed.  Or the opposite - being so fidgety or restless that you have been moving around a lot more than usual?  2 - more than half the days   Thoughts that you would be better off dead, or of hurting yourself?  2 - more than half the days   Patient Health Questionnaire Score: 23       If depressive symptoms identified deferred to follow up visit unless specifically addressed in assesment and plan.    Interpretation of PHQ-9 Total Score   Score Severity   1-4 No Depression   5-9 Mild Depression   10-14 Moderate Depression   15-19 Moderately Severe Depression   20-27 Severe Depression        3/23/2023     7:53 AM    AMIRA-7 ANXIETY SCALE FLOWSHEET   Feeling nervous, anxious, or on edge 1   Not being able to stop or control worrying 2   Worrying too much about different things 2   Trouble  relaxing 2   Being so restless that it is hard to sit still 2   Becoming easily annoyed or irritable 2   Feeling afraid as if something awful might happen 1   AMIRA-7 Total Score 12       Interpretation of AMIRA-7 Total Score   Score Severity   0-4 Minimal Anxiety  5-9 Mild Anxiety   10-14 Moderate Anxiety  15-21 Severy Anxiety        Assessment/Plan:  17 y.o. female with the following -    1. PMDD (premenstrual dysphoric disorder)  Chronic, not at goal. At this point I would recommend further imaging for evaluation. She has concerns about endometriosis, discussed that this would not be identified on US but it would rule out ovarian cysts. If normal, consider referral to gyn. Patient advised to  Midol from the pharmacy and use for OTC relief as tylenol is not helpful.  Order transabdominal ultrasound as patient has never had intercourse with a male.  - US-PELVIC TRANSABDOMINAL ONLY; Future    2. Moderate major depression (HCC)  Chronic, not at goal.  No current plans for self-harm.  Discussed with patient that I would like to restart her on sertraline, she will begin by breaking tablets in half for the first 2 weeks and taper up to 50 mg.  She will be on this for 2 weeks before her follow-up with me in 4 weeks.  Referral also placed for individual therapy.  Can consider if dose adjustment is needed at that time.  - sertraline (ZOLOFT) 50 MG Tab; Take 1 Tablet by mouth every day.  Dispense: 30 Tablet; Refill: 11  - Referral for Individual Therapy    3. Primary insomnia  Chronic, not at goal.  Likely related to #2.  Recommend that patient try doxylamine 25 mg nightly 1 hour prior to bed    Follow-up: Return in about 4 weeks (around 4/20/2023).    Health Maintenance: Completed      Please note that this dictation was created using voice recognition software. I have made every reasonable attempt to correct obvious errors, but I expect that there are errors of grammar and possibly content that I did not discover  before finalizing the note.    Electronically signed by CARLITOS Waters on March 23, 2023

## 2023-03-28 ENCOUNTER — OCCUPATIONAL MEDICINE (OUTPATIENT)
Dept: OCCUPATIONAL MEDICINE | Facility: CLINIC | Age: 18
End: 2023-03-28
Payer: COMMERCIAL

## 2023-03-28 VITALS
WEIGHT: 151 LBS | SYSTOLIC BLOOD PRESSURE: 116 MMHG | OXYGEN SATURATION: 97 % | TEMPERATURE: 97.7 F | DIASTOLIC BLOOD PRESSURE: 72 MMHG | HEART RATE: 98 BPM | RESPIRATION RATE: 14 BRPM | HEIGHT: 64 IN | BODY MASS INDEX: 25.78 KG/M2

## 2023-03-28 DIAGNOSIS — S60.211D CONTUSION OF RIGHT WRIST, SUBSEQUENT ENCOUNTER: ICD-10-CM

## 2023-03-28 PROCEDURE — 99213 OFFICE O/P EST LOW 20 MIN: CPT | Performed by: NURSE PRACTITIONER

## 2023-03-28 ASSESSMENT — ENCOUNTER SYMPTOMS
SENSORY CHANGE: 0
PSYCHIATRIC NEGATIVE: 1
WEAKNESS: 0
MYALGIAS: 0
CARDIOVASCULAR NEGATIVE: 1
TINGLING: 0
CONSTITUTIONAL NEGATIVE: 1
RESPIRATORY NEGATIVE: 1

## 2023-03-28 ASSESSMENT — FIBROSIS 4 INDEX: FIB4 SCORE: .3589594320875166869

## 2023-03-28 ASSESSMENT — PAIN SCALES - GENERAL: PAINLEVEL: NO PAIN

## 2023-03-28 NOTE — LETTER
[address],   [address] [place] [address]  Phone:  [phone] - Fax:  [phone]   Occupational Health [facility] Progress Report and Disability Certification  Date of Service: 3/28/2023   No Show:  No  Date / Time of Next Visit:  Discharged/MMI   Released to Full Duty    Claim Information   Patient Name: Nancy Rodrigues  Claim Number:     Employer: [employer]  Date of Injury: 2/28/2023     Insurer / TPA: Yvette  ID / SSN:     Occupation: Production II  Diagnosis: The encounter diagnosis was Contusion of right wrist, subsequent encounter.    Medical Information   Related to Industrial Injury? Yes    Subjective Complaints:  DOI: 2/28/23. NIRMAL: Patient states she was pushing a cart full of supplies and accidentally smashed her right wrist against the cart and  a shelf.  Patient symptoms are significantly improved she has no more soreness with joint flexion.She denies numbness, tingling, or weakness.  She has not needed to take any Tylenol as she completely and the wrist splint with minimal difficulty.  She completed the stretching exercises which seem to be helping. She has been able to tolerate light duty.  Will be released from care at this time.  Hand therapy referral no longer indicated.  Plan of care discussed with patient   Objective Findings: Right wrist: FROM with flexion and extension of right wrist joint with mild discomfort at dorsal aspect.  No swelling, erythema, bruising, deformity at wrist joint.  No wrist joint no swelling, abnormal warmth, or drainage from site.  No tenderness on palpation at this region as well. Neg pain with pronation or supination   Pre-Existing Condition(s):     Assessment:   Condition Improved    Status: Discharged /  MMI  Permanent Disability:No    Plan:      Diagnostics:      Comments:  Discharged/MMI   Released to Full Duty   Follow-up if needed     Disability Information   Status: Released to Full Duty     From:  3/28/2023  Through:   Restrictions are:     Physical Restrictions   Sitting:    Standing:    Stooping:    Bending:      Squatting:    Walking:    Climbing:    Pushing:      Pulling:    Other:    Reaching Above Shoulder (L):   Reaching Above Shoulder (R):       Reaching Below Shoulder (L):    Reaching Below Shoulder (R):      Not to exceed Weight Limits   Carrying(hrs):   Weight Limit(lb):   Lifting(hrs):   Weight  Limit(lb):     Comments:      Repetitive Actions   Hands: i.e. Fine Manipulations from Grasping:     Feet: i.e. Operating Foot Controls:     Driving / Operate Machinery:     Health Care Provider’s Original or Electronic Signature  MODESTA Haley Health Care Provider’s Original or Electronic Signature    Gigi Real DO MPH     Clinic Name / Location: [place] [address]  [place] [address] Clinic Phone Number: Dept: [phone]   Appointment Time: 10:45 Am Visit Start Time: 10:55 AM   Check-In Time:  10:51 Am Visit Discharge Time:  11:11AM   Original-Treating Physician or Chiropractor    Page 2-Insurer/TPA    Page 3-Employer    Page 4-Employee

## 2023-03-28 NOTE — PROGRESS NOTES
"Subjective:     Nancy Rodrigues is a 17 y.o. female who presents for Follow-Up (Better -  17)      DOI: 2/28/23. NIRMAL: Patient states she was pushing a cart full of supplies and accidentally smashed her right wrist against the cart and  a shelf.  Patient symptoms are significantly improved she has no more soreness with joint flexion.She denies numbness, tingling, or weakness.  She has not needed to take any Tylenol as she completely and the wrist splint with minimal difficulty.  She completed the stretching exercises which seem to be helping. She has been able to tolerate light duty.  Will be released from care at this time.  Hand therapy referral no longer indicated.  Plan of care discussed with patient    Review of Systems   Constitutional: Negative.    Respiratory: Negative.     Cardiovascular: Negative.    Musculoskeletal:  Negative for joint pain and myalgias.   Skin: Negative.    Neurological:  Negative for tingling, sensory change and weakness.   Psychiatric/Behavioral: Negative.       SOCHX: Works as [employer] at [employer].   FH: No pertinent family history to this problem       Objective:     /72   Pulse 98   Temp 36.5 °C (97.7 °F) (Temporal)   Resp 14   Ht 1.626 m (5' 4\")   Wt 68.5 kg (151 lb)   SpO2 97%   BMI 25.92 kg/m²     Constitutional: Patient is in no acute distress. Appears well-developed and well-nourished.   Cardiovascular: Normal rate.    Pulmonary/Chest: Effort normal. No respiratory distress.   Neurological: Patient is alert and oriented to person, place, and time.   Skin: Skin is warm and dry.   Psychiatric: Normal mood and affect. Behavior is normal.     Right wrist: FROM with flexion and extension of right wrist joint with mild discomfort at dorsal aspect.  No swelling, erythema, bruising, deformity at wrist joint.  No wrist joint no swelling, abnormal warmth, or drainage from site.  No tenderness on palpation at this region as well. Neg pain with pronation or " supination    Assessment/Plan:       1. Contusion of right wrist, subsequent encounter  - Referral to Occupational Therapy    Released to Full Duty FROM 3/28/2023 TO         Discharged/MMI   Released to Full Duty   Follow-up if needed     Differential diagnosis, natural history, supportive care, and indications for immediate follow-up discussed.    Approximately 25 minutes was spent in preparing for visit, obtaining history, exam and evaluation, patient counseling/education and post visit documentation/orders.

## 2023-04-20 ENCOUNTER — APPOINTMENT (OUTPATIENT)
Dept: MEDICAL GROUP | Facility: PHYSICIAN GROUP | Age: 18
End: 2023-04-20
Payer: COMMERCIAL

## 2023-05-30 ENCOUNTER — OFFICE VISIT (OUTPATIENT)
Dept: URGENT CARE | Facility: PHYSICIAN GROUP | Age: 18
End: 2023-05-30
Payer: COMMERCIAL

## 2023-05-30 VITALS
BODY MASS INDEX: 24.07 KG/M2 | TEMPERATURE: 99.2 F | SYSTOLIC BLOOD PRESSURE: 96 MMHG | WEIGHT: 141 LBS | HEART RATE: 114 BPM | DIASTOLIC BLOOD PRESSURE: 54 MMHG | OXYGEN SATURATION: 98 % | RESPIRATION RATE: 16 BRPM | HEIGHT: 64 IN

## 2023-05-30 DIAGNOSIS — J02.9 SORE THROAT: ICD-10-CM

## 2023-05-30 DIAGNOSIS — J02.0 STREP PHARYNGITIS: ICD-10-CM

## 2023-05-30 DIAGNOSIS — Z20.818 EXPOSURE TO STREPTOCOCCAL PHARYNGITIS: ICD-10-CM

## 2023-05-30 LAB
INT CON NEG: NEGATIVE
INT CON POS: POSITIVE
S PYO AG THROAT QL: POSITIVE

## 2023-05-30 PROCEDURE — 99213 OFFICE O/P EST LOW 20 MIN: CPT | Performed by: PHYSICIAN ASSISTANT

## 2023-05-30 PROCEDURE — 3074F SYST BP LT 130 MM HG: CPT | Performed by: PHYSICIAN ASSISTANT

## 2023-05-30 PROCEDURE — 87880 STREP A ASSAY W/OPTIC: CPT | Performed by: PHYSICIAN ASSISTANT

## 2023-05-30 PROCEDURE — 3078F DIAST BP <80 MM HG: CPT | Performed by: PHYSICIAN ASSISTANT

## 2023-05-30 RX ORDER — CEPHALEXIN 500 MG/1
500 CAPSULE ORAL 2 TIMES DAILY
Qty: 20 CAPSULE | Refills: 0 | Status: SHIPPED | OUTPATIENT
Start: 2023-05-30 | End: 2023-06-09

## 2023-05-30 ASSESSMENT — ENCOUNTER SYMPTOMS: COUGH: 1

## 2023-05-30 ASSESSMENT — FIBROSIS 4 INDEX: FIB4 SCORE: .3589594320875166869

## 2023-05-30 NOTE — LETTER
May 30, 2023    To Whom It May Concern:         This is confirmation that Nancy Rodrigues attended her scheduled appointment with Misty Winter P.A.-C. on 5/30/23. Please excuse patient from work today and tomorrow.         If you have any questions please do not hesitate to call me at the phone number listed below.    Sincerely,          Misty Winter P.A.-C.  [phone]

## 2023-05-31 NOTE — PROGRESS NOTES
Subjective:   Nancy Rodrigues is a 17 y.o. female who presents for Pharyngitis (Onset 4 days/Brother has strep) and Cough (Productive/Onset 4 days/)  Patient presents chief complaint of sore throat which started on Friday.  Her brother was diagnosed with strep pharyngitis on Thursday.  She reports some pain with swallowing.  No fevers or chills.  She is able to eat and drink.  No COVID concerns.      Pharyngitis   Associated symptoms include coughing.   Cough          Review of Systems   Respiratory:  Positive for cough.        Medications:  norgestimate-ethinyl estradiol  sertraline Tabs  SUMAtriptan Tabs    Allergies:             Amoxicillin    Surgical History:         Past Surgical History:   Procedure Laterality Date    PB REMOVAL DEEP IMPLANT Left 10/24/2022    Procedure: LEFT FOOT REMOVAL INTERNAL FIXATION;  Surgeon: Moy Yost M.D.;  Location: [facility];  Service: Orthopedics    PB FUSION FOOT BONE,MIDTARSAL,1 JT Left 10/24/2022    Procedure: FUSION -Lapidus/Midfoot Fusion;  Surgeon: Moy Yost M.D.;  Location: [facility];  Service: Orthopedics    BUNIONECTOMY Left 12/18/2017    Procedure: BUNIONECTOMY/FOR REPAIR OF HALLUX VALGUS;  Surgeon: Moy Yost M.D.;  Location: [facility];  Service: Orthopedics    ORTHOPEDIC OSTEOTOMY Left 12/18/2017    Procedure: ORTHOPEDIC OSTEOTOMY/AND DELIA;  Surgeon: Moy Yost M.D.;  Location: [facility];  Service: Orthopedics    ORTHOPEDIC OSTEOTOMY Right 11/13/2017    Procedure: ORTHOPEDIC OSTEOTOMY-Proximal with bone grafting;  Surgeon: Moy Yost M.D.;  Location: [facility];  Service: Orthopedics    BUNIONECTOMY Right 11/13/2017    Procedure: BUNIONECTOMY - HALLUX VALGUS CORRECTION WITH DELIA;  Surgeon: Moy Yost M.D.;  Location: [facility];  Service: Orthopedics    OTHER      T&A - about 4 years old.       Past Social Hx:  Nancy Rodrigues  reports that she has  "never smoked. She has never used smokeless tobacco. She reports current alcohol use. She reports that she does not currently use drugs.     Past Family Hx:   Nancy Rodrigues family history includes Cancer in her maternal grandfather; Hypertension in her father.       Problem list, medications, and allergies reviewed by myself today in Epic.     Objective:     BP 96/54 (BP Location: Left arm, Patient Position: Sitting, BP Cuff Size: Adult long)   Pulse (!) 114   Temp 37.3 °C (99.2 °F) (Temporal)   Resp 16   Ht 1.626 m (5' 4\")   Wt 64 kg (141 lb)   SpO2 98%   BMI 24.20 kg/m²     Physical Exam  Vitals and nursing note reviewed.   Constitutional:       General: She is not in acute distress.     Appearance: Normal appearance. She is well-developed. She is not ill-appearing or diaphoretic.   HENT:      Head: Normocephalic.      Right Ear: Tympanic membrane normal.      Left Ear: Tympanic membrane normal.      Nose: Congestion and rhinorrhea present.      Mouth/Throat:      Palate: No mass.      Pharynx: Posterior oropharyngeal erythema present. No pharyngeal swelling, oropharyngeal exudate or uvula swelling.      Tonsils: No tonsillar exudate or tonsillar abscesses. 1+ on the right. 1+ on the left.   Eyes:      Conjunctiva/sclera: Conjunctivae normal.      Pupils: Pupils are equal, round, and reactive to light.   Cardiovascular:      Rate and Rhythm: Normal rate and regular rhythm.      Pulses: Normal pulses.      Heart sounds: No murmur heard.  Pulmonary:      Effort: Pulmonary effort is normal. No tachypnea, accessory muscle usage or respiratory distress.      Breath sounds: Normal breath sounds. No stridor. No decreased breath sounds, wheezing, rhonchi or rales.      Comments: Lungs are clear to auscultation bilaterally without rhonchi rales or wheezes  Musculoskeletal:         General: Normal range of motion.      Cervical back: Normal range of motion and neck supple.   Skin:     General: Skin is warm and " dry.   Neurological:      Mental Status: She is alert and oriented to person, place, and time.   Psychiatric:         Behavior: Behavior is cooperative.         Assessment/Plan:     Diagnosis and Associated Orders:     1. Sore throat  - POCT Rapid Strep A    2. Exposure to Streptococcal pharyngitis  - POCT Rapid Strep A    3. Strep pharyngitis  - cephALEXin (KEFLEX) 500 MG Cap; Take 1 Capsule by mouth 2 times a day for 10 days.  Dispense: 20 Capsule; Refill: 0        Comments/MDM:  POSITIVE Strep A.  Discussed antibiotic treatment for full 10 days, salt water gargles, soft foods, cool liquids, ibuprofen and Tylenol for any pain or fevers. Switch out your toothbrush for a new toothbrush in 2-3 days time.  You will be contagious for 24 hours after initiation of antibiotis.   Return to the urgent care if symptoms are not improving or any worsening symptoms or concerns. Present to the emergency room or call 911 if any severe swelling of the throat, difficulty swallowing, difficulty breathing, wheezing, or any other severe concerns.     I considered other causes of pharyngitis including Group C, G strep, peritonsillar abscess, Mononucleosis, christiano's angina, and retropharyngeal abscess but the patient's reported symptoms and my exam do not support these alternative diagnosis based on information I have available today.  This may change and I encouraged the patient to return to clinic if they are experiencing new symptoms or their symptoms fail to resolve with time as we cannot rule out all pathology from a single Urgent Care visit.     The short duration of patient's symptoms and lack of constitutional symptoms made testing for Mono futile.  Since it takes 6-10 days for your body to begin to produce antibodies (which monospot tests for), there is a very high likelihood of a false negative.  I encouraged the patient to return to clinic in 1-2 weeks if they feel other constitutional symptoms that would suggest  EBV/Mono.          I personally reviewed prior external notes and test results pertinent to today's visit. Supportive care, natural history, differential diagnoses, and indications for immediate follow-up discussed. Return to clinic or go to ED if symptoms worsen or persist.  Red flag symptoms discussed.  Patient/Parent/Guardian voices understanding. Follow-up with your primary care provider in 3-5 days.  All side effects of medication discussed including allergic response, GI upset, tendon injury, rash, sedation etc    Please note that this dictation was created using voice recognition software. I have made a reasonable attempt to correct obvious errors, but I expect that there are errors of grammar and possibly content that I did not discover before finalizing the note.    This note was electronically signed by Misty Winter PA-C

## 2023-07-18 ENCOUNTER — OFFICE VISIT (OUTPATIENT)
Dept: MEDICAL GROUP | Facility: PHYSICIAN GROUP | Age: 18
End: 2023-07-18
Payer: COMMERCIAL

## 2023-07-18 VITALS
SYSTOLIC BLOOD PRESSURE: 108 MMHG | TEMPERATURE: 98.8 F | HEART RATE: 111 BPM | OXYGEN SATURATION: 96 % | RESPIRATION RATE: 16 BRPM | WEIGHT: 138.4 LBS | DIASTOLIC BLOOD PRESSURE: 72 MMHG | BODY MASS INDEX: 24.52 KG/M2 | HEIGHT: 63 IN

## 2023-07-18 DIAGNOSIS — F32.81 PMDD (PREMENSTRUAL DYSPHORIC DISORDER): ICD-10-CM

## 2023-07-18 DIAGNOSIS — F32.1 MODERATE MAJOR DEPRESSION (HCC): ICD-10-CM

## 2023-07-18 PROCEDURE — 99214 OFFICE O/P EST MOD 30 MIN: CPT

## 2023-07-18 PROCEDURE — 96127 BRIEF EMOTIONAL/BEHAV ASSMT: CPT

## 2023-07-18 PROCEDURE — 3078F DIAST BP <80 MM HG: CPT

## 2023-07-18 PROCEDURE — 3074F SYST BP LT 130 MM HG: CPT

## 2023-07-18 RX ORDER — SERTRALINE HYDROCHLORIDE 100 MG/1
100 TABLET, FILM COATED ORAL DAILY
Qty: 30 TABLET | Refills: 11 | Status: SHIPPED | OUTPATIENT
Start: 2023-07-18

## 2023-07-18 ASSESSMENT — ANXIETY QUESTIONNAIRES
2. NOT BEING ABLE TO STOP OR CONTROL WORRYING: SEVERAL DAYS
7. FEELING AFRAID AS IF SOMETHING AWFUL MIGHT HAPPEN: NOT AT ALL
6. BECOMING EASILY ANNOYED OR IRRITABLE: NOT AT ALL
IF YOU CHECKED OFF ANY PROBLEMS ON THIS QUESTIONNAIRE, HOW DIFFICULT HAVE THESE PROBLEMS MADE IT FOR YOU TO DO YOUR WORK, TAKE CARE OF THINGS AT HOME, OR GET ALONG WITH OTHER PEOPLE: SOMEWHAT DIFFICULT
5. BEING SO RESTLESS THAT IT IS HARD TO SIT STILL: SEVERAL DAYS
GAD7 TOTAL SCORE: 4
1. FEELING NERVOUS, ANXIOUS, OR ON EDGE: SEVERAL DAYS
4. TROUBLE RELAXING: NOT AT ALL
3. WORRYING TOO MUCH ABOUT DIFFERENT THINGS: SEVERAL DAYS

## 2023-07-18 ASSESSMENT — PATIENT HEALTH QUESTIONNAIRE - PHQ9
CLINICAL INTERPRETATION OF PHQ2 SCORE: 2
SUM OF ALL RESPONSES TO PHQ QUESTIONS 1-9: 9
5. POOR APPETITE OR OVEREATING: 2 - MORE THAN HALF THE DAYS

## 2023-07-18 ASSESSMENT — FIBROSIS 4 INDEX: FIB4 SCORE: .3589594320875166869

## 2023-07-18 NOTE — ASSESSMENT & PLAN NOTE
Patient presents for follow-up on her menstrual cramping.  3 months ago, pelvic ultrasound was ordered for further evaluation however she has not gotten this done. She is wanting to have a second opinion from a gynecologist as her friend has endometriosis and she feels that she may also have this. No known family history of endometriosis but does have a family history of ovarian cancer in her grandmother.

## 2023-07-18 NOTE — PROGRESS NOTES
"CC:   Chief Complaint   Patient presents with    Medication Follow-up     Follow up on birth control   No US done yet        HISTORY OF PRESENT ILLNESS: Patient is a 17 y.o. female established patient who presents today to discuss the following problems below:     PMDD (premenstrual dysphoric disorder)  Patient presents for follow-up on her menstrual cramping.  3 months ago, pelvic ultrasound was ordered for further evaluation however she has not gotten this done. She is wanting to have a second opinion from a gynecologist as her friend has endometriosis and she feels that she may also have this. No known family history of endometriosis but does have a family history of ovarian cancer in her grandmother.     Moderate major depression (HCC)  Patient was re-started on sertraline 50mg daily back in March for management of moderate major depressive disorder. She does feel somewhat of a difference.     Review of Systems: Otherwise negative except for as stated above.      Exam: /72   Pulse (!) 111   Temp 37.1 °C (98.8 °F) (Temporal)   Resp 16   Ht 1.6 m (5' 3\")   Wt 62.8 kg (138 lb 6.4 oz)   SpO2 96%  Body mass index is 24.52 kg/m².    Physical Exam  Constitutional:       Appearance: Normal appearance.   Eyes:      Extraocular Movements: Extraocular movements intact.   Pulmonary:      Effort: Pulmonary effort is normal.   Musculoskeletal:      Cervical back: Normal range of motion.   Neurological:      General: No focal deficit present.      Mental Status: She is alert and oriented to person, place, and time.   Psychiatric:         Mood and Affect: Mood normal.         Behavior: Behavior normal.       Depression Screening    Little interest or pleasure in doing things?  1 - several days   Feeling down, depressed , or hopeless? 1 - several days   Trouble falling or staying asleep, or sleeping too much?  2 - more than half the days   Feeling tired or having little energy?  2 - more than half the days   Poor " appetite or overeating?  2 - more than half the days   Feeling bad about yourself - or that you are a failure or have let yourself or your family down? 1 - several days   Trouble concentrating on things, such as reading the newspaper or watching television? 0 - not at all   Moving or speaking so slowly that other people could have noticed.  Or the opposite - being so fidgety or restless that you have been moving around a lot more than usual?  0 - not at all   Thoughts that you would be better off dead, or of hurting yourself?  0 - not at all   Patient Health Questionnaire Score: 9       If depressive symptoms identified deferred to follow up visit unless specifically addressed in assesment and plan.    Interpretation of PHQ-9 Total Score   Score Severity   1-4 No Depression   5-9 Mild Depression   10-14 Moderate Depression   15-19 Moderately Severe Depression   20-27 Severe Depression        7/18/2023     1:42 PM 3/23/2023     7:53 AM    AMIRA-7 ANXIETY SCALE FLOWSHEET   Feeling nervous, anxious, or on edge 1 1   Not being able to stop or control worrying 1 2   Worrying too much about different things 1 2   Trouble relaxing 0 2   Being so restless that it is hard to sit still 1 2   Becoming easily annoyed or irritable 0 2   Feeling afraid as if something awful might happen 0 1   AMIRA-7 Total Score 4 12   How difficult have these problems made it for you to do your work, take care of things at home, or get along with other people? Somewhat difficult        Interpretation of AMIRA-7 Total Score   Score Severity   0-4 Minimal Anxiety  5-9 Mild Anxiety   10-14 Moderate Anxiety  15-21 Severy Anxiety        Assessment/Plan:  17 y.o. female with the following -    1. PMDD (premenstrual dysphoric disorder)  Chronic, not at goal and cramps worsening. Hx of mom with ovarian cysts and grandmother with ovarian cancer, reiterated importance of obtaining pelvic US prior to gyn consult. Patient verbalized understanding.   - Referral  to Gynecology    2. Moderate major depression (HCC)  Chronic, not at goal. Significant improvements in PHQ 9 and AMIRA 7 scores, but still room to increase dose. Increase to 100mg daily. Re-evaluate in 4 weeks.   - sertraline (ZOLOFT) 100 MG Tab; Take 1 Tablet by mouth every day.  Dispense: 30 Tablet; Refill: 11      Follow-up: Return in about 4 weeks (around 8/15/2023) for depression/anxiety follow up.    Health Maintenance: Completed      Please note that this dictation was created using voice recognition software. I have made every reasonable attempt to correct obvious errors, but I expect that there are errors of grammar and possibly content that I did not discover before finalizing the note.    Electronically signed by CARLITOS Waters on July 18, 2023

## 2023-07-18 NOTE — ASSESSMENT & PLAN NOTE
Patient was re-started on sertraline 50mg daily back in March for management of moderate major depressive disorder. She does feel somewhat of a difference.    Erivedge Counseling- I discussed with the patient the risks of Erivedge including but not limited to nausea, vomiting, diarrhea, constipation, weight loss, changes in the sense of taste, decreased appetite, muscle spasms, and hair loss.  The patient verbalized understanding of the proper use and possible adverse effects of Erivedge.  All of the patient's questions and concerns were addressed.

## 2023-08-10 ENCOUNTER — HOSPITAL ENCOUNTER (OUTPATIENT)
Dept: RADIOLOGY | Facility: MEDICAL CENTER | Age: 18
End: 2023-08-10
Payer: COMMERCIAL

## 2023-08-10 DIAGNOSIS — F32.81 PMDD (PREMENSTRUAL DYSPHORIC DISORDER): ICD-10-CM

## 2023-08-10 PROCEDURE — 76856 US EXAM PELVIC COMPLETE: CPT

## 2023-11-30 ENCOUNTER — OFFICE VISIT (OUTPATIENT)
Dept: URGENT CARE | Facility: PHYSICIAN GROUP | Age: 18
End: 2023-11-30
Payer: COMMERCIAL

## 2023-11-30 VITALS
WEIGHT: 137.79 LBS | DIASTOLIC BLOOD PRESSURE: 62 MMHG | TEMPERATURE: 98.4 F | OXYGEN SATURATION: 98 % | SYSTOLIC BLOOD PRESSURE: 102 MMHG | RESPIRATION RATE: 18 BRPM | HEART RATE: 100 BPM | HEIGHT: 63 IN | BODY MASS INDEX: 24.41 KG/M2

## 2023-11-30 DIAGNOSIS — R68.89 FLU-LIKE SYMPTOMS: ICD-10-CM

## 2023-11-30 LAB
FLUAV RNA SPEC QL NAA+PROBE: NEGATIVE
FLUBV RNA SPEC QL NAA+PROBE: NEGATIVE
RSV RNA SPEC QL NAA+PROBE: NEGATIVE
SARS-COV-2 RNA RESP QL NAA+PROBE: NEGATIVE

## 2023-11-30 PROCEDURE — 99213 OFFICE O/P EST LOW 20 MIN: CPT | Performed by: PHYSICIAN ASSISTANT

## 2023-11-30 PROCEDURE — 3078F DIAST BP <80 MM HG: CPT | Performed by: PHYSICIAN ASSISTANT

## 2023-11-30 PROCEDURE — 0241U POCT CEPHEID COV-2, FLU A/B, RSV - PCR: CPT | Performed by: PHYSICIAN ASSISTANT

## 2023-11-30 PROCEDURE — 3074F SYST BP LT 130 MM HG: CPT | Performed by: PHYSICIAN ASSISTANT

## 2023-11-30 ASSESSMENT — FIBROSIS 4 INDEX: FIB4 SCORE: 0.38

## 2023-11-30 NOTE — PROGRESS NOTES
"Subjective:   Nancy Rodrigues is a 18 y.o. female who presents for Cough (Sore throat, cough X 2 days. Pt states she has vomited for 2 days. Symptoms progressively worsening. Describes sharp pain in chest when coughing)      HPI  The patient presents to the Urgent Care with complaints of cough and congestion onset 2 days ago.  Associated sore throat, headache, vomiting.  She is spitting up mucus.  Her chest hurts with coughing.  Feels like she has brain fog.  Shortness of breath at night while trying to sleep.  Fever of 102F max 2 days ago which is resolving.  Denies any chest pain, diarrhea, loss of taste or smell. Vapes occasionally. Sister is currently sick with similar symptoms.         Past Medical History:   Diagnosis Date    Anesthesia     PONV     Environmental and seasonal allergies     History of migraine headaches      Allergies   Allergen Reactions    Amoxicillin Swelling     Patient experienced swelling of the lips        Objective:     /62 (BP Location: Left arm, Patient Position: Sitting, BP Cuff Size: Adult)   Pulse 100   Temp 36.9 °C (98.4 °F) (Temporal)   Resp 18   Ht 1.6 m (5' 3\")   Wt 62.5 kg (137 lb 12.6 oz)   SpO2 98%   BMI 24.41 kg/m²     Physical Exam  Vitals reviewed.   Constitutional:       General: She is not in acute distress.     Appearance: Normal appearance. She is not ill-appearing or toxic-appearing.   HENT:      Head: Normocephalic.      Mouth/Throat:      Mouth: Mucous membranes are moist.      Pharynx: Oropharynx is clear. No oropharyngeal exudate or posterior oropharyngeal erythema.   Eyes:      Conjunctiva/sclera: Conjunctivae normal.      Pupils: Pupils are equal, round, and reactive to light.   Cardiovascular:      Rate and Rhythm: Normal rate and regular rhythm.      Heart sounds: Normal heart sounds.   Pulmonary:      Effort: Pulmonary effort is normal. No respiratory distress.      Breath sounds: Normal breath sounds. No wheezing, rhonchi or rales. "   Musculoskeletal:      Cervical back: Neck supple. No rigidity.   Lymphadenopathy:      Cervical: No cervical adenopathy.   Skin:     General: Skin is warm and dry.   Neurological:      General: No focal deficit present.      Mental Status: She is alert and oriented to person, place, and time.   Psychiatric:         Mood and Affect: Mood normal.         Behavior: Behavior normal.       Results for orders placed or performed in visit on 11/30/23   POCT CoV-2, Flu A/B, RSV by PCR   Result Value Ref Range    SARS-CoV-2 by PCR Negative Negative, Invalid    Influenza virus A RNA Negative Negative, Invalid    Influenza virus B, PCR Negative Negative, Invalid    RSV, PCR Negative Negative, Invalid       Diagnosis and associated orders:     1. Flu-like symptoms  - POCT CoV-2, Flu A/B, RSV by PCR       Comments/MDM:     The patient's presenting symptoms and exam findings most likely are due to a viral etiology.   Symptomatic and supportive care:   Plenty of oral hydration and rest   Over the counter cough suppressant as directed.  Tylenol or ibuprofen for pain and fever as directed.   Warm salt water gargles for sore throat, soft foods, cool liquids.   Saline nasal spray and Flonase  Infection control measures at home. Stay away from people, Hand washing, covering sneeze/cough, disinfect surfaces.   Remain home from work, school, and other populated environments.    Overall, the patient is well-appearing. They are not hypoxic, afebrile, and a normal pulmonary exam.       I personally reviewed prior external notes and test results pertinent to today's visit. Pathogenesis of diagnosis discussed including typical length and natural progression. Supportive care, natural history, differential diagnoses, and indications for immediate follow-up discussed. Patient expresses understanding and agrees to plan. Patient denies any other questions or concerns.     Follow-up with the primary care physician for recheck, reevaluation, and  consideration of further management.    Please note that this dictation was created using voice recognition software. I have made a reasonable attempt to correct obvious errors, but I expect that there are errors of grammar and possibly content that I did not discover before finalizing the note.    This note was electronically signed by Yosi Mondragon PA-C

## 2024-09-03 ENCOUNTER — APPOINTMENT (OUTPATIENT)
Dept: MEDICAL GROUP | Facility: PHYSICIAN GROUP | Age: 19
End: 2024-09-03
Payer: COMMERCIAL

## 2024-09-03 VITALS
DIASTOLIC BLOOD PRESSURE: 72 MMHG | WEIGHT: 154.8 LBS | TEMPERATURE: 98.4 F | HEIGHT: 64 IN | HEART RATE: 107 BPM | RESPIRATION RATE: 16 BRPM | BODY MASS INDEX: 26.43 KG/M2 | OXYGEN SATURATION: 97 % | SYSTOLIC BLOOD PRESSURE: 100 MMHG

## 2024-09-03 DIAGNOSIS — Z11.3 SCREENING FOR STD (SEXUALLY TRANSMITTED DISEASE): ICD-10-CM

## 2024-09-03 DIAGNOSIS — Z13.21 SCREENING FOR ENDOCRINE, NUTRITIONAL, METABOLIC AND IMMUNITY DISORDER: ICD-10-CM

## 2024-09-03 DIAGNOSIS — N94.6 DYSMENORRHEA: ICD-10-CM

## 2024-09-03 DIAGNOSIS — Z13.0 SCREENING FOR ENDOCRINE, NUTRITIONAL, METABOLIC AND IMMUNITY DISORDER: ICD-10-CM

## 2024-09-03 DIAGNOSIS — Z11.4 SCREENING FOR HIV (HUMAN IMMUNODEFICIENCY VIRUS): ICD-10-CM

## 2024-09-03 DIAGNOSIS — Z13.29 SCREENING FOR ENDOCRINE, NUTRITIONAL, METABOLIC AND IMMUNITY DISORDER: ICD-10-CM

## 2024-09-03 DIAGNOSIS — F32.1 MODERATE MAJOR DEPRESSION (HCC): ICD-10-CM

## 2024-09-03 DIAGNOSIS — Z23 NEED FOR VACCINATION: ICD-10-CM

## 2024-09-03 DIAGNOSIS — Z13.228 SCREENING FOR ENDOCRINE, NUTRITIONAL, METABOLIC AND IMMUNITY DISORDER: ICD-10-CM

## 2024-09-03 DIAGNOSIS — Z11.59 NEED FOR HEPATITIS C SCREENING TEST: ICD-10-CM

## 2024-09-03 DIAGNOSIS — Z00.00 WELLNESS EXAMINATION: ICD-10-CM

## 2024-09-03 PROCEDURE — 99395 PREV VISIT EST AGE 18-39: CPT | Mod: 25

## 2024-09-03 PROCEDURE — 90471 IMMUNIZATION ADMIN: CPT

## 2024-09-03 PROCEDURE — 90715 TDAP VACCINE 7 YRS/> IM: CPT

## 2024-09-03 PROCEDURE — 90651 9VHPV VACCINE 2/3 DOSE IM: CPT

## 2024-09-03 PROCEDURE — 90472 IMMUNIZATION ADMIN EACH ADD: CPT

## 2024-09-03 PROCEDURE — 3078F DIAST BP <80 MM HG: CPT

## 2024-09-03 PROCEDURE — 3074F SYST BP LT 130 MM HG: CPT

## 2024-09-03 RX ORDER — SERTRALINE HYDROCHLORIDE 100 MG/1
100 TABLET, FILM COATED ORAL DAILY
Qty: 90 TABLET | Refills: 3 | Status: SHIPPED | OUTPATIENT
Start: 2024-10-01

## 2024-09-03 SDOH — HEALTH STABILITY: PHYSICAL HEALTH: ON AVERAGE, HOW MANY MINUTES DO YOU ENGAGE IN EXERCISE AT THIS LEVEL?: 0 MIN

## 2024-09-03 SDOH — ECONOMIC STABILITY: INCOME INSECURITY: HOW HARD IS IT FOR YOU TO PAY FOR THE VERY BASICS LIKE FOOD, HOUSING, MEDICAL CARE, AND HEATING?: NOT VERY HARD

## 2024-09-03 SDOH — ECONOMIC STABILITY: FOOD INSECURITY: WITHIN THE PAST 12 MONTHS, THE FOOD YOU BOUGHT JUST DIDN'T LAST AND YOU DIDN'T HAVE MONEY TO GET MORE.: NEVER TRUE

## 2024-09-03 SDOH — ECONOMIC STABILITY: INCOME INSECURITY: IN THE LAST 12 MONTHS, WAS THERE A TIME WHEN YOU WERE NOT ABLE TO PAY THE MORTGAGE OR RENT ON TIME?: NO

## 2024-09-03 SDOH — HEALTH STABILITY: MENTAL HEALTH
STRESS IS WHEN SOMEONE FEELS TENSE, NERVOUS, ANXIOUS, OR CAN'T SLEEP AT NIGHT BECAUSE THEIR MIND IS TROUBLED. HOW STRESSED ARE YOU?: RATHER MUCH

## 2024-09-03 SDOH — HEALTH STABILITY: PHYSICAL HEALTH: ON AVERAGE, HOW MANY DAYS PER WEEK DO YOU ENGAGE IN MODERATE TO STRENUOUS EXERCISE (LIKE A BRISK WALK)?: 0 DAYS

## 2024-09-03 SDOH — ECONOMIC STABILITY: FOOD INSECURITY: WITHIN THE PAST 12 MONTHS, YOU WORRIED THAT YOUR FOOD WOULD RUN OUT BEFORE YOU GOT MONEY TO BUY MORE.: NEVER TRUE

## 2024-09-03 ASSESSMENT — SOCIAL DETERMINANTS OF HEALTH (SDOH)
HOW OFTEN DO YOU ATTEND CHURCH OR RELIGIOUS SERVICES?: NEVER
HOW OFTEN DO YOU GET TOGETHER WITH FRIENDS OR RELATIVES?: PATIENT DECLINED
HOW OFTEN DO YOU ATTENT MEETINGS OF THE CLUB OR ORGANIZATION YOU BELONG TO?: NEVER
DO YOU BELONG TO ANY CLUBS OR ORGANIZATIONS SUCH AS CHURCH GROUPS UNIONS, FRATERNAL OR ATHLETIC GROUPS, OR SCHOOL GROUPS?: NO
HOW MANY DRINKS CONTAINING ALCOHOL DO YOU HAVE ON A TYPICAL DAY WHEN YOU ARE DRINKING: 1 OR 2
HOW OFTEN DO YOU HAVE A DRINK CONTAINING ALCOHOL: MONTHLY OR LESS
ARE YOU MARRIED, WIDOWED, DIVORCED, SEPARATED, NEVER MARRIED, OR LIVING WITH A PARTNER?: NEVER MARRIED
HOW OFTEN DO YOU ATTEND CHURCH OR RELIGIOUS SERVICES?: NEVER
HOW HARD IS IT FOR YOU TO PAY FOR THE VERY BASICS LIKE FOOD, HOUSING, MEDICAL CARE, AND HEATING?: NOT VERY HARD
HOW OFTEN DO YOU HAVE SIX OR MORE DRINKS ON ONE OCCASION: NEVER
DO YOU BELONG TO ANY CLUBS OR ORGANIZATIONS SUCH AS CHURCH GROUPS UNIONS, FRATERNAL OR ATHLETIC GROUPS, OR SCHOOL GROUPS?: NO
HOW OFTEN DO YOU ATTENT MEETINGS OF THE CLUB OR ORGANIZATION YOU BELONG TO?: NEVER
IN THE PAST 12 MONTHS, HAS THE ELECTRIC, GAS, OIL, OR WATER COMPANY THREATENED TO SHUT OFF SERVICE IN YOUR HOME?: NO
WITHIN THE PAST 12 MONTHS, YOU WORRIED THAT YOUR FOOD WOULD RUN OUT BEFORE YOU GOT THE MONEY TO BUY MORE: NEVER TRUE
IN A TYPICAL WEEK, HOW MANY TIMES DO YOU TALK ON THE PHONE WITH FAMILY, FRIENDS, OR NEIGHBORS?: MORE THAN THREE TIMES A WEEK
ARE YOU MARRIED, WIDOWED, DIVORCED, SEPARATED, NEVER MARRIED, OR LIVING WITH A PARTNER?: NEVER MARRIED
IN A TYPICAL WEEK, HOW MANY TIMES DO YOU TALK ON THE PHONE WITH FAMILY, FRIENDS, OR NEIGHBORS?: MORE THAN THREE TIMES A WEEK
HOW OFTEN DO YOU GET TOGETHER WITH FRIENDS OR RELATIVES?: PATIENT DECLINED

## 2024-09-03 ASSESSMENT — PATIENT HEALTH QUESTIONNAIRE - PHQ9
5. POOR APPETITE OR OVEREATING: SEVERAL DAYS
6. FEELING BAD ABOUT YOURSELF - OR THAT YOU ARE A FAILURE OR HAVE LET YOURSELF OR YOUR FAMILY DOWN: MORE THAN HALF THE DAYS
4. FEELING TIRED OR HAVING LITTLE ENERGY: MORE THAN HALF THE DAYS
2. FEELING DOWN, DEPRESSED, IRRITABLE, OR HOPELESS: SEVERAL DAYS
9. THOUGHTS THAT YOU WOULD BE BETTER OFF DEAD, OR OF HURTING YOURSELF: NOT AT ALL
1. LITTLE INTEREST OR PLEASURE IN DOING THINGS: MORE THAN HALF THE DAYS
8. MOVING OR SPEAKING SO SLOWLY THAT OTHER PEOPLE COULD HAVE NOTICED. OR THE OPPOSITE, BEING SO FIGETY OR RESTLESS THAT YOU HAVE BEEN MOVING AROUND A LOT MORE THAN USUAL: NOT AT ALL
SUM OF ALL RESPONSES TO PHQ9 QUESTIONS 1 AND 2: 3
SUM OF ALL RESPONSES TO PHQ QUESTIONS 1-9: 11
3. TROUBLE FALLING OR STAYING ASLEEP OR SLEEPING TOO MUCH: MORE THAN HALF THE DAYS
7. TROUBLE CONCENTRATING ON THINGS, SUCH AS READING THE NEWSPAPER OR WATCHING TELEVISION: SEVERAL DAYS

## 2024-09-03 ASSESSMENT — LIFESTYLE VARIABLES
HOW MANY STANDARD DRINKS CONTAINING ALCOHOL DO YOU HAVE ON A TYPICAL DAY: 1 OR 2
HOW OFTEN DO YOU HAVE SIX OR MORE DRINKS ON ONE OCCASION: NEVER
AUDIT-C TOTAL SCORE: 1
HOW OFTEN DO YOU HAVE A DRINK CONTAINING ALCOHOL: MONTHLY OR LESS
SKIP TO QUESTIONS 9-10: 1

## 2024-09-03 NOTE — PROGRESS NOTES
Subjective:     CC:   Chief Complaint   Patient presents with    Annual Exam       HPI:   Nancy Rodrigues is a 19 y.o. female who presents for annual exam. She is feeling well and denies any complaints.    They/Them    Ob-Gyn/ History:    Patient has GYN provider: no  /Para:  G0  Last Pap Smear:  None. Wants referral to gynecologist   Gyn Surgery:  None.  Current Contraceptive Method:  was on Depo, giving side effects, nothing currently. Not currently sexually active.  Last menstrual period:  24.  Periods irregular after depo shot. heavy bleeding. Cramping is severe.   She does take OTC analgesics for cramps.  No significant bloating/fluid retention, pelvic pain, or dyspareunia. No vaginal discharge  Post-menopausal bleeding: NA  Urinary incontinence: NA  Folate intake: Counseled     Health Maintenance  Advanced directive: NA   Osteoporosis Screen/ DEXA: NA   PT/vit D for falls prevention: NA   Cholesterol Screening: Ordered   Diabetes Screening: Ordered   Aspirin Use: NA      Anticipatory Guidance  Diet: Homecooked   Exercise: No, counseled   Substance Abuse: None   Safe in relationship - No hx of issues with domestic violence   Seat belts, bike helmet, gun safety discussed.  Sun protection used.    Cancer screening  Colorectal Cancer Screening: Counseled, 43    Lung Cancer Screening: NA    Cervical Cancer Screening: Counseled   Breast Cancer Screening: Counseled     Infectious disease screening/Immunizations  --STI Screening: Ordered   --Practices safe sex.  --HIV Screening: Ordered   --Hepatitis C Screening: Ordered   --Immunizations:    Influenza: NA    HPV:  Ordered    Tetanus: TDAP    Shingles: n/a    Pneumococcal : NA         Other immunizations: Ordered     She  has a past medical history of Anesthesia, Environmental and seasonal allergies, and History of migraine headaches.  She  has a past surgical history that includes other; orthopedic osteotomy (Right, 2017); bunionectomy (Right,  "11/13/2017); bunionectomy (Left, 12/18/2017); orthopedic osteotomy (Left, 12/18/2017); pr removal deep implant (Left, 10/24/2022); and pr fusion foot bone,midtarsal,1 jt (Left, 10/24/2022).    Family History   Problem Relation Age of Onset    Hypertension Father     Cancer Maternal Grandfather        Social History     Socioeconomic History    Marital status: Single     Spouse name: Not on file    Number of children: Not on file    Years of education: Not on file    Highest education level: 12th grade   Occupational History    Not on file   Tobacco Use    Smoking status: Never    Smokeless tobacco: Never   Vaping Use    Vaping status: Former    Substances: Nicotine, Flavoring   Substance and Sexual Activity    Alcohol use: Not Currently     Comment: \"occasional\"    Drug use: Not Currently    Sexual activity: Not Currently   Other Topics Concern    Behavioral problems Not Asked    Interpersonal relationships Not Asked    Sad or not enjoying activities Not Asked    Suicidal thoughts Not Asked    Poor school performance Not Asked    Reading difficulties Not Asked    Speech difficulties Not Asked    Writing difficulties Not Asked    Inadequate sleep Not Asked    Excessive TV viewing Not Asked    Excessive video game use Not Asked    Inadequate exercise Not Asked    Sports related Not Asked    Poor diet Not Asked    Family concerns for drug/alcohol abuse Not Asked    Poor oral hygiene Not Asked    Bike safety Not Asked    Family concerns vehicle safety Not Asked   Social History Narrative    Not on file     Social Determinants of Health     Financial Resource Strain: Low Risk  (9/3/2024)    Overall Financial Resource Strain (CARDIA)     Difficulty of Paying Living Expenses: Not very hard   Food Insecurity: No Food Insecurity (9/3/2024)    Hunger Vital Sign     Worried About Running Out of Food in the Last Year: Never true     Ran Out of Food in the Last Year: Never true   Transportation Needs: No Transportation Needs " (9/3/2024)    PRAPARE - Transportation     Lack of Transportation (Medical): No     Lack of Transportation (Non-Medical): No   Physical Activity: Inactive (9/3/2024)    Exercise Vital Sign     Days of Exercise per Week: 0 days     Minutes of Exercise per Session: 0 min   Stress: Stress Concern Present (9/3/2024)    [employer] - Occupational Stress Questionnaire     Feeling of Stress : Rather much   Social Connections: Socially Isolated (9/3/2024)    Social Connection and Isolation Panel [NHANES]     Frequency of Communication with Friends and Family: More than three times a week     Frequency of Social Gatherings with Friends and Family: Patient declined     Attends Zoroastrian Services: Never     Active Member of Clubs or Organizations: No     Attends Club or Organization Meetings: Never     Marital Status: Never    Intimate Partner Violence: Not on file   Housing Stability: Unknown (9/3/2024)    Housing Stability Vital Sign     Unable to Pay for Housing in the Last Year: No     Number of Times Moved in the Last Year: Not on file     Homeless in the Last Year: No       Patient Active Problem List    Diagnosis Date Noted    Establishing care with new doctor, encounter for 11/08/2021    Bell's palsy 11/15/2022    Moderate major depression (HCC) 11/15/2022    Intractable migraine without aura and without status migrainosus 10/06/2022    Primary insomnia 10/06/2022    Retained orthopedic hardware 08/18/2022    Pain of left foot 08/18/2022    PMDD (premenstrual dysphoric disorder) 11/08/2021    Bilateral bunions 11/08/2021    Periauricular lymphadenopathy 11/08/2021         Current Outpatient Medications   Medication Sig Dispense Refill    sertraline (ZOLOFT) 50 MG Tab Take 0.5 Tablets by mouth every day for 7 days, THEN 1 Tablet every day for 21 days. 25 Tablet 0    [START ON 10/1/2024] sertraline (ZOLOFT) 100 MG Tab Take 1 Tablet by mouth every day. 90 Tablet 3    sertraline (ZOLOFT)  "100 MG Tab Take 1 Tablet by mouth every day. 30 Tablet 11    SUMAtriptan (IMITREX) 50 MG Tab Take 1 Tablet by mouth one time as needed for Migraine for up to 1 dose. 9 Tablet 3    norgestimate-ethinyl estradiol (ORTHO-CYCLEN) 0.25-35 MG-MCG per tablet Take 1 Tablet by mouth every day. (Patient not taking: Reported on 9/3/2024) 90 Tablet 3     No current facility-administered medications for this visit.     Allergies   Allergen Reactions    Amoxicillin Swelling     Patient experienced swelling of the lips       Review of Systems   Constitutional: Negative for fever, chills and malaise/fatigue.   HENT: Negative for congestion.    Eyes: Negative for pain.   Respiratory: Negative for cough and shortness of breath.    Cardiovascular: Negative for leg swelling.   Gastrointestinal: Negative for nausea, vomiting, abdominal pain and diarrhea.   Genitourinary: Negative for dysuria and hematuria.   Skin: Negative for rash.   Neurological: Negative for dizziness, focal weakness and headaches.   Endo/Heme/Allergies: Does not bruise/bleed easily.   Psychiatric/Behavioral: Negative for depression.  The patient is not nervous/anxious.      Objective:     /72   Pulse (!) 107   Temp 36.9 °C (98.4 °F) (Temporal)   Resp 16   Ht 1.626 m (5' 4\")   Wt 70.2 kg (154 lb 12.8 oz)   LMP 08/26/2024 (Approximate)   SpO2 97%   BMI 26.57 kg/m²   Body mass index is 26.57 kg/m².  Wt Readings from Last 4 Encounters:   09/03/24 70.2 kg (154 lb 12.8 oz)   11/30/23 62.5 kg (137 lb 12.6 oz)   07/18/23 62.8 kg (138 lb 6.4 oz) (67%, Z= 0.43)*   05/30/23 64 kg (141 lb) (70%, Z= 0.53)*     * Growth percentiles are based on CDC (Girls, 2-20 Years) data.       Physical Exam:  Constitutional: Well-developed and well-nourished. Not diaphoretic. No distress.   Skin: Skin is warm and dry. No rash noted.  Head: Atraumatic without lesions.  Eyes: Conjunctivae and extraocular motions are normal. Pupils are equal, round, and reactive to light. No scleral " icterus.   Ears:  External ears unremarkable. Tympanic membranes clear and intact.  Nose: Nares patent. Septum midline. Turbinates without erythema nor edema. No discharge.   Mouth/Throat: Dentition is intact. Tongue normal. Oropharynx is clear and moist. Posterior pharynx without erythema or exudates.  Neck: Supple, trachea midline. Normal range of motion. No thyromegaly present. No lymphadenopathy--cervical or supraclavicular.  Cardiovascular: Regular rate and rhythm, S1 and S2 without murmur, rubs, or gallops.  Lungs: Normal inspiratory effort, CTA bilaterally, no wheezes/rhonchi/rales  Breast: Deferred   Abdomen: Soft, non tender, and without distention. Active bowel sounds in all four quadrants. No rebound, guarding, masses or HSM.  : Deferred  Extremities: No cyanosis, clubbing, erythema, nor edema. Distal pulses intact and symmetric.   Musculoskeletal: All major joints AROM full in all directions without pain.  Neurological: Alert and oriented x 3. DTRs 2+/3 and symmetric. No cranial nerve deficit. 5/5 myotomes. Sensation intact.   Psychiatric:  Behavior, mood, and affect are appropriate.    A chaperone was offered to the patient during today's exam. Patient declined chaperone.    Assessment and Plan:     Problem List Items Addressed This Visit       Moderate major depression (HCC)    Relevant Medications    sertraline (ZOLOFT) 50 MG Tab    sertraline (ZOLOFT) 100 MG Tab (Start on 10/1/2024)     Other Visit Diagnoses       Need for vaccination        Relevant Orders    9VHPV Vaccine 2-3 Dose (GARDASIL 9) (Completed)    Tdap Vaccine =>8YO IM (Completed)    Screening for STD (sexually transmitted disease)        Relevant Orders    Chlamydia/GC, PCR (Urine)    Screening for endocrine, nutritional, metabolic and immunity disorder        Wellness examination        Relevant Orders    CBC WITHOUT DIFFERENTIAL    Lipid Profile    TSH WITH REFLEX TO FT4    Comp Metabolic Panel    Need for hepatitis C screening test         Relevant Orders    HEP C VIRUS ANTIBODY    Screening for HIV (human immunodeficiency virus)        Relevant Orders    HIV AG/AB COMBO ASSAY SCREENING    Dysmenorrhea        Relevant Orders    Referral to OB/Gyn          HCM:  Up to date   Labs per orders  Immunizations per orders  Patient counseled about skin care, diet, supplements, prenatal vitamins, safe sex and exercise.      Follow-up: Return in about 1 year (around 9/3/2025) for AWV.

## 2024-09-12 ENCOUNTER — HOSPITAL ENCOUNTER (OUTPATIENT)
Dept: LAB | Facility: MEDICAL CENTER | Age: 19
End: 2024-09-12
Payer: COMMERCIAL

## 2024-09-12 DIAGNOSIS — Z11.4 SCREENING FOR HIV (HUMAN IMMUNODEFICIENCY VIRUS): ICD-10-CM

## 2024-09-12 DIAGNOSIS — Z11.59 NEED FOR HEPATITIS C SCREENING TEST: ICD-10-CM

## 2024-09-12 DIAGNOSIS — Z00.00 WELLNESS EXAMINATION: ICD-10-CM

## 2024-09-12 DIAGNOSIS — Z11.3 SCREENING FOR STD (SEXUALLY TRANSMITTED DISEASE): ICD-10-CM

## 2024-09-12 LAB
ALBUMIN SERPL BCP-MCNC: 4.9 G/DL (ref 3.2–4.9)
ALBUMIN/GLOB SERPL: 1.7 G/DL
ALP SERPL-CCNC: 87 U/L (ref 30–99)
ALT SERPL-CCNC: 15 U/L (ref 2–50)
ANION GAP SERPL CALC-SCNC: 14 MMOL/L (ref 7–16)
AST SERPL-CCNC: 23 U/L (ref 12–45)
BILIRUB SERPL-MCNC: 0.8 MG/DL (ref 0.1–1.5)
BUN SERPL-MCNC: 8 MG/DL (ref 8–22)
CALCIUM ALBUM COR SERPL-MCNC: 8.8 MG/DL (ref 8.5–10.5)
CALCIUM SERPL-MCNC: 9.5 MG/DL (ref 8.5–10.5)
CHLORIDE SERPL-SCNC: 101 MMOL/L (ref 96–112)
CHOLEST SERPL-MCNC: 142 MG/DL (ref 100–199)
CO2 SERPL-SCNC: 23 MMOL/L (ref 20–33)
CREAT SERPL-MCNC: 0.78 MG/DL (ref 0.5–1.4)
ERYTHROCYTE [DISTWIDTH] IN BLOOD BY AUTOMATED COUNT: 40.8 FL (ref 35.9–50)
GFR SERPLBLD CREATININE-BSD FMLA CKD-EPI: 111 ML/MIN/1.73 M 2
GLOBULIN SER CALC-MCNC: 2.9 G/DL (ref 1.9–3.5)
GLUCOSE SERPL-MCNC: 93 MG/DL (ref 65–99)
HCT VFR BLD AUTO: 45.9 % (ref 37–47)
HCV AB SER QL: NORMAL
HDLC SERPL-MCNC: 40 MG/DL
HGB BLD-MCNC: 15.4 G/DL (ref 12–16)
HIV 1+2 AB+HIV1 P24 AG SERPL QL IA: NORMAL
LDLC SERPL CALC-MCNC: 78 MG/DL
MCH RBC QN AUTO: 30.7 PG (ref 27–33)
MCHC RBC AUTO-ENTMCNC: 33.6 G/DL (ref 32.2–35.5)
MCV RBC AUTO: 91.6 FL (ref 81.4–97.8)
PLATELET # BLD AUTO: 312 K/UL (ref 164–446)
PMV BLD AUTO: 11.1 FL (ref 9–12.9)
POTASSIUM SERPL-SCNC: 3.9 MMOL/L (ref 3.6–5.5)
PROT SERPL-MCNC: 7.8 G/DL (ref 6–8.2)
RBC # BLD AUTO: 5.01 M/UL (ref 4.2–5.4)
SODIUM SERPL-SCNC: 138 MMOL/L (ref 135–145)
TRIGL SERPL-MCNC: 121 MG/DL (ref 0–149)
TSH SERPL DL<=0.005 MIU/L-ACNC: 1.12 UIU/ML (ref 0.38–5.33)
WBC # BLD AUTO: 6.4 K/UL (ref 4.8–10.8)

## 2024-09-12 PROCEDURE — 80053 COMPREHEN METABOLIC PANEL: CPT

## 2024-09-12 PROCEDURE — 86803 HEPATITIS C AB TEST: CPT

## 2024-09-12 PROCEDURE — 80061 LIPID PANEL: CPT

## 2024-09-12 PROCEDURE — 84443 ASSAY THYROID STIM HORMONE: CPT

## 2024-09-12 PROCEDURE — 85027 COMPLETE CBC AUTOMATED: CPT

## 2024-09-12 PROCEDURE — 87591 N.GONORRHOEAE DNA AMP PROB: CPT

## 2024-09-12 PROCEDURE — 36415 COLL VENOUS BLD VENIPUNCTURE: CPT

## 2024-09-12 PROCEDURE — 87491 CHLMYD TRACH DNA AMP PROBE: CPT

## 2024-09-12 PROCEDURE — 87389 HIV-1 AG W/HIV-1&-2 AB AG IA: CPT

## 2024-09-13 LAB
C TRACH DNA SPEC QL NAA+PROBE: NEGATIVE
N GONORRHOEA DNA SPEC QL NAA+PROBE: NEGATIVE
SPECIMEN SOURCE: NORMAL

## 2024-09-23 ENCOUNTER — OFFICE VISIT (OUTPATIENT)
Dept: URGENT CARE | Facility: PHYSICIAN GROUP | Age: 19
End: 2024-09-23
Payer: COMMERCIAL

## 2024-09-23 VITALS
HEART RATE: 100 BPM | RESPIRATION RATE: 18 BRPM | OXYGEN SATURATION: 96 % | SYSTOLIC BLOOD PRESSURE: 108 MMHG | TEMPERATURE: 98.9 F | WEIGHT: 148 LBS | BODY MASS INDEX: 25.4 KG/M2 | DIASTOLIC BLOOD PRESSURE: 64 MMHG

## 2024-09-23 DIAGNOSIS — J01.10 ACUTE NON-RECURRENT FRONTAL SINUSITIS: ICD-10-CM

## 2024-09-23 PROCEDURE — 3074F SYST BP LT 130 MM HG: CPT

## 2024-09-23 PROCEDURE — 3078F DIAST BP <80 MM HG: CPT

## 2024-09-23 PROCEDURE — 99213 OFFICE O/P EST LOW 20 MIN: CPT

## 2024-09-23 RX ORDER — DEXTROMETHORPHAN HYDROBROMIDE AND PROMETHAZINE HYDROCHLORIDE 15; 6.25 MG/5ML; MG/5ML
5 SYRUP ORAL
Qty: 25 ML | Refills: 0 | Status: SHIPPED | OUTPATIENT
Start: 2024-09-23 | End: 2024-09-28

## 2024-09-23 RX ORDER — FLUTICASONE PROPIONATE 50 MCG
2 SPRAY, SUSPENSION (ML) NASAL
Qty: 16 G | Refills: 1 | Status: SHIPPED | OUTPATIENT
Start: 2024-09-23

## 2024-09-23 RX ORDER — DULOXETIN HYDROCHLORIDE 20 MG/1
20 CAPSULE, DELAYED RELEASE ORAL DAILY
COMMUNITY
Start: 2024-09-18

## 2024-09-23 RX ORDER — HYDROXYZINE HYDROCHLORIDE 10 MG/1
10 TABLET, FILM COATED ORAL 3 TIMES DAILY PRN
COMMUNITY
Start: 2024-09-18

## 2024-09-23 RX ORDER — AZITHROMYCIN 250 MG/1
TABLET, FILM COATED ORAL
Qty: 6 TABLET | Refills: 0 | Status: SHIPPED | OUTPATIENT
Start: 2024-09-23

## 2024-09-23 RX ORDER — TRAZODONE HYDROCHLORIDE 50 MG/1
50 TABLET, FILM COATED ORAL NIGHTLY
COMMUNITY
Start: 2024-09-18

## 2024-09-23 ASSESSMENT — FIBROSIS 4 INDEX: FIB4 SCORE: 0.4

## 2024-09-23 ASSESSMENT — ENCOUNTER SYMPTOMS
NAUSEA: 0
COUGH: 1
SORE THROAT: 1
ABDOMINAL PAIN: 0
CHILLS: 0
SINUS PAIN: 1
FEVER: 0
HEADACHES: 1
VOMITING: 0
SHORTNESS OF BREATH: 0

## 2024-09-24 NOTE — PROGRESS NOTES
Chief Complaint   Patient presents with    Sore Throat     X 6 days with cough and headache       HISTORY OF PRESENT ILLNESS: Patient is a pleasant 19 y.o. person who presents to urgent care today ongoing nasal congestion with a cough and sore throat for the last 6 days.  Patient also has a headache.  She does vape.  No previous history of chronic conditions noted otherwise.  She has been taking OTC meds with little to no relief.    Patient Active Problem List    Diagnosis Date Noted    Bell's palsy 11/15/2022    Moderate major depression (HCC) 11/15/2022    Intractable migraine without aura and without status migrainosus 10/06/2022    Primary insomnia 10/06/2022    Retained orthopedic hardware 08/18/2022    Pain of left foot 08/18/2022    PMDD (premenstrual dysphoric disorder) 11/08/2021    Bilateral bunions 11/08/2021    Establishing care with new doctor, encounter for 11/08/2021    Periauricular lymphadenopathy 11/08/2021       Allergies:Amoxicillin    Current Outpatient Medications Ordered in Epic   Medication Sig Dispense Refill    DULoxetine (CYMBALTA) 20 MG Cap DR Particles Take 20 mg by mouth every day.      hydrOXYzine HCl (ATARAX) 10 MG Tab Take 10 mg by mouth 3 times a day as needed for Anxiety.      traZODone (DESYREL) 50 MG Tab Take 50 mg by mouth every evening.      azithromycin (ZITHROMAX) 250 MG Tab Take 2 tabs today, then 1 tab p.o. daily 6 Tablet 0    fluticasone (FLONASE) 50 MCG/ACT nasal spray Administer 2 Sprays into affected nostril(S) at bedtime. 16 g 1    promethazine-dextromethorphan (PROMETHAZINE-DM) 6.25-15 MG/5ML syrup Take 5 mL by mouth 1 time a day as needed for Cough (at night only for cough) for up to 5 days. 25 mL 0    sertraline (ZOLOFT) 50 MG Tab Take 0.5 Tablets by mouth every day for 7 days, THEN 1 Tablet every day for 21 days. 25 Tablet 0    [START ON 10/1/2024] sertraline (ZOLOFT) 100 MG Tab Take 1 Tablet by mouth every day. 90 Tablet 3    sertraline (ZOLOFT) 100 MG Tab Take 1  "Tablet by mouth every day. 30 Tablet 11    SUMAtriptan (IMITREX) 50 MG Tab Take 1 Tablet by mouth one time as needed for Migraine for up to 1 dose. 9 Tablet 3    norgestimate-ethinyl estradiol (ORTHO-CYCLEN) 0.25-35 MG-MCG per tablet Take 1 Tablet by mouth every day. (Patient not taking: Reported on 9/3/2024) 90 Tablet 3     No current Epic-ordered facility-administered medications on file.       Past Medical History:   Diagnosis Date    Anesthesia     PONV     Environmental and seasonal allergies     History of migraine headaches        Social History     Tobacco Use    Smoking status: Never    Smokeless tobacco: Never   Vaping Use    Vaping status: Former    Substances: Nicotine, Flavoring   Substance Use Topics    Alcohol use: Not Currently     Comment: \"occasional\"    Drug use: Not Currently       Family Status   Relation Name Status    Mo  Alive    Fa  Alive    MGMo  Alive    MGFa      PGMo  Alive    PGFa  Alive   No partnership data on file     Family History   Problem Relation Age of Onset    Hypertension Father     Cancer Maternal Grandfather        Review of Systems   Constitutional:  Positive for malaise/fatigue. Negative for chills and fever.   HENT:  Positive for congestion, sinus pain and sore throat. Negative for ear pain.    Respiratory:  Positive for cough. Negative for shortness of breath.    Gastrointestinal:  Negative for abdominal pain, nausea and vomiting.   Skin:  Negative for rash.   Neurological:  Positive for headaches.       Exam:  /64 (BP Location: Left arm, Patient Position: Sitting, BP Cuff Size: Adult)   Pulse 100   Temp 37.2 °C (98.9 °F) (Temporal)   Resp 18   Wt 67.1 kg (148 lb)   SpO2 96%   Physical Exam  Vitals reviewed.   Constitutional:       Appearance: Normal appearance. Nancy is normal weight. Nancy is not toxic-appearing.   HENT:      Head: Normocephalic.      Right Ear: Ear canal and external ear normal. There is no impacted cerumen. Tympanic membrane is " erythematous.      Left Ear: Ear canal and external ear normal. There is no impacted cerumen. Tympanic membrane is erythematous.      Nose: Congestion and rhinorrhea present. No nasal tenderness or mucosal edema. Rhinorrhea is clear.      Mouth/Throat:      Mouth: Mucous membranes are moist.      Pharynx: Posterior oropharyngeal erythema present. No oropharyngeal exudate.      Tonsils: No tonsillar exudate. 1+ on the right. 1+ on the left.   Eyes:      General:         Right eye: No discharge.         Left eye: No discharge.      Extraocular Movements: Extraocular movements intact.      Conjunctiva/sclera: Conjunctivae normal.      Pupils: Pupils are equal, round, and reactive to light.   Cardiovascular:      Rate and Rhythm: Normal rate and regular rhythm.      Pulses: Normal pulses.      Heart sounds: Normal heart sounds. No murmur heard.  Pulmonary:      Effort: Pulmonary effort is normal. No respiratory distress.      Breath sounds: Normal breath sounds. No stridor. No wheezing, rhonchi or rales.      Comments: Positive dry cough  Musculoskeletal:         General: No swelling, tenderness, deformity or signs of injury.      Cervical back: Normal range of motion and neck supple. No tenderness.   Skin:     General: Skin is warm and dry.      Capillary Refill: Capillary refill takes less than 2 seconds.      Findings: No bruising, erythema, lesion or rash.   Neurological:      General: No focal deficit present.      Mental Status: Nancy is alert.   Psychiatric:         Mood and Affect: Mood normal.         Behavior: Behavior normal.         Thought Content: Thought content normal.         Judgment: Judgment normal.           Assessment/Plan:  1. Acute non-recurrent frontal sinusitis  - azithromycin (ZITHROMAX) 250 MG Tab; Take 2 tabs today, then 1 tab p.o. daily  Dispense: 6 Tablet; Refill: 0  - fluticasone (FLONASE) 50 MCG/ACT nasal spray; Administer 2 Sprays into affected nostril(S) at bedtime.  Dispense: 16 g;  Refill: 1  - promethazine-dextromethorphan (PROMETHAZINE-DM) 6.25-15 MG/5ML syrup; Take 5 mL by mouth 1 time a day as needed for Cough (at night only for cough) for up to 5 days.  Dispense: 25 mL; Refill: 0    Other orders  - DULoxetine (CYMBALTA) 20 MG Cap DR Particles; Take 20 mg by mouth every day.  - hydrOXYzine HCl (ATARAX) 10 MG Tab; Take 10 mg by mouth 3 times a day as needed for Anxiety.  - traZODone (DESYREL) 50 MG Tab; Take 50 mg by mouth every evening.    Based on physical exam along with review of systems I did place patient on Zithromax as she is allergic to amoxicillin, should she have a strep component this should cover for this.  Patient does have noted nasal congestion with rhinorrhea and slight erythremia to her throat, her tympanic membranes are red, nonbulging.  She does have a slight dry cough.  This patient has been sick for nearly 6 days I did go ahead and provide antibiotics.  Patient encouraged to stop vaping.  Medications in the form of Promethazine DM and Flonase sent for comfort measures as well, reviewed their use.    Supportive care, differential diagnoses, and indications for immediate follow-up discussed with patient.   Pathogenesis of diagnosis discussed including typical length and natural progression.   Instructed to return to clinic or nearest emergency department for any change in condition, further concerns, or worsening of symptoms.  Patient states understanding of the plan of care and discharge instructions.  Instructed to make an appointment, for follow up, with primary care provider.    Please note that this dictation was created using voice recognition software. I have made every reasonable attempt to correct obvious errors, but I expect that there are errors of grammar and possibly content that I did not discover before finalizing the note.      Marisel VELASCO

## 2024-09-30 ENCOUNTER — APPOINTMENT (OUTPATIENT)
Dept: MEDICAL GROUP | Facility: PHYSICIAN GROUP | Age: 19
End: 2024-09-30
Payer: COMMERCIAL

## 2024-10-31 ENCOUNTER — GYNECOLOGY VISIT (OUTPATIENT)
Dept: GYNECOLOGY | Facility: CLINIC | Age: 19
End: 2024-10-31
Payer: COMMERCIAL

## 2024-10-31 DIAGNOSIS — Z91.199 NO-SHOW FOR APPOINTMENT: ICD-10-CM

## 2024-10-31 PROCEDURE — 99999 PR NO CHARGE: CPT | Performed by: STUDENT IN AN ORGANIZED HEALTH CARE EDUCATION/TRAINING PROGRAM

## 2024-11-13 ENCOUNTER — TELEPHONE (OUTPATIENT)
Dept: OBGYN | Facility: CLINIC | Age: 19
End: 2024-11-13
Payer: COMMERCIAL

## 2024-11-14 ENCOUNTER — APPOINTMENT (OUTPATIENT)
Dept: GYNECOLOGY | Facility: CLINIC | Age: 19
End: 2024-11-14
Payer: COMMERCIAL

## 2024-12-03 ENCOUNTER — APPOINTMENT (OUTPATIENT)
Dept: GYNECOLOGY | Facility: CLINIC | Age: 19
End: 2024-12-03
Payer: COMMERCIAL

## 2024-12-03 ENCOUNTER — HOSPITAL ENCOUNTER (OUTPATIENT)
Facility: MEDICAL CENTER | Age: 19
End: 2024-12-03
Attending: STUDENT IN AN ORGANIZED HEALTH CARE EDUCATION/TRAINING PROGRAM
Payer: COMMERCIAL

## 2024-12-03 VITALS
BODY MASS INDEX: 27.05 KG/M2 | SYSTOLIC BLOOD PRESSURE: 110 MMHG | WEIGHT: 147 LBS | HEIGHT: 62 IN | HEART RATE: 99 BPM | DIASTOLIC BLOOD PRESSURE: 72 MMHG

## 2024-12-03 DIAGNOSIS — Z12.4 CERVICAL CANCER SCREENING: ICD-10-CM

## 2024-12-03 DIAGNOSIS — N94.6 DYSMENORRHEA: ICD-10-CM

## 2024-12-03 PROCEDURE — 99459 PELVIC EXAMINATION: CPT | Performed by: STUDENT IN AN ORGANIZED HEALTH CARE EDUCATION/TRAINING PROGRAM

## 2024-12-03 PROCEDURE — 88142 CYTOPATH C/V THIN LAYER: CPT

## 2024-12-03 PROCEDURE — 99204 OFFICE O/P NEW MOD 45 MIN: CPT | Performed by: STUDENT IN AN ORGANIZED HEALTH CARE EDUCATION/TRAINING PROGRAM

## 2024-12-03 ASSESSMENT — FIBROSIS 4 INDEX: FIB4 SCORE: 0.4

## 2024-12-03 NOTE — PROGRESS NOTES
Gynecology Visit        CC: dysmenorrhea      HPI  Nancy Asha Rodrigues is a 19 y.o. NB  presenting today for the above.  Patient reports severe dysmenorrhea and pelvic pain outside of her periods.  Pain is sharp and cramping throughout the entire pelvis.  Reports regular monthly periods, however has occasional intermenstrual spotting.  Has tried Depo for 1 year had irregular bleeding, also tried OCP for 1 year which partially helped with pain.  Occasionally takes Tylenol for cramping which does not help. Dysmenorrhea is so severe that she passes out, unable to do daily activities.  Also has painful bowel movements during periods.  Denies blood in her stools.  Denies bladder symptoms.  Not sexually active.  Patient presents with her mom, they report a strong family history of pelvic pain and heavy menstrual bleeding.      Imaging  Pelvic US : normal          OB History    Para Term  AB Living   0 0 0 0 0 0   SAB IAB Ectopic Molar Multiple Live Births   0 0 0 0 0 0       Past Medical History  Past Medical History:   Diagnosis Date    Anesthesia     PONV     Anxiety     Depression     Environmental and seasonal allergies     History of migraine headaches        Past Surgical History  Past Surgical History:   Procedure Laterality Date    PB REMOVAL DEEP IMPLANT Left 10/24/2022    Procedure: LEFT FOOT REMOVAL INTERNAL FIXATION;  Surgeon: Moy Yost M.D.;  Location: [facility];  Service: Orthopedics    PB FUSION FOOT BONE,MIDTARSAL,1 JT Left 10/24/2022    Procedure: FUSION -Lapidus/Midfoot Fusion;  Surgeon: Moy Yost M.D.;  Location: [facility];  Service: Orthopedics    BUNIONECTOMY Left 2017    Procedure: BUNIONECTOMY/FOR REPAIR OF HALLUX VALGUS;  Surgeon: Moy Yost M.D.;  Location: [facility];  Service: Orthopedics    ORTHOPEDIC OSTEOTOMY Left 2017    Procedure: ORTHOPEDIC OSTEOTOMY/AND DELIA;  Surgeon: Moy Yost M.D.;   "Location: SURGERY [facility];  Service: Orthopedics    ORTHOPEDIC OSTEOTOMY Right 11/13/2017    Procedure: ORTHOPEDIC OSTEOTOMY-Proximal with bone grafting;  Surgeon: Moy Yost M.D.;  Location: SURGERY [facility];  Service: Orthopedics    BUNIONECTOMY Right 11/13/2017    Procedure: BUNIONECTOMY - HALLUX VALGUS CORRECTION WITH DELIA;  Surgeon: Moy Yost M.D.;  Location: SURGERY [facility];  Service: Orthopedics    OTHER      T&A - about 4 years old.       Social History  Social History     Tobacco Use    Smoking status: Never    Smokeless tobacco: Never   Vaping Use    Vaping status: Every Day    Substances: Nicotine, Flavoring    Devices: Disposable   Substance Use Topics    Alcohol use: Not Currently     Comment: \"occasional\"    Drug use: Not Currently        Family History  Family History   Problem Relation Age of Onset    Hypertension Father     Breast Cancer Maternal Grandmother     Uterine cancer Maternal Grandmother     Cancer Maternal Grandfather     Breast Cancer Paternal Grandmother        Home Medications  Current Outpatient Medications   Medication Sig    DULoxetine (CYMBALTA) 20 MG Cap DR Particles Take 20 mg by mouth every day.    hydrOXYzine HCl (ATARAX) 10 MG Tab Take 10 mg by mouth 3 times a day as needed for Anxiety.    traZODone (DESYREL) 50 MG Tab Take 50 mg by mouth every evening.    azithromycin (ZITHROMAX) 250 MG Tab Take 2 tabs today, then 1 tab p.o. daily    sertraline (ZOLOFT) 100 MG Tab Take 1 Tablet by mouth every day.    sertraline (ZOLOFT) 100 MG Tab Take 1 Tablet by mouth every day.    SUMAtriptan (IMITREX) 50 MG Tab Take 1 Tablet by mouth one time as needed for Migraine for up to 1 dose.    fluticasone (FLONASE) 50 MCG/ACT nasal spray Administer 2 Sprays into affected nostril(S) at bedtime. (Patient not taking: Reported on 12/3/2024)    norgestimate-ethinyl estradiol (ORTHO-CYCLEN) 0.25-35 MG-MCG per tablet Take 1 Tablet by mouth every day. (Patient not " "taking: Reported on 9/3/2024)       Allergies/Reactions  Allergies   Allergen Reactions    Amoxicillin Swelling     Patient experienced swelling of the lips          ROS: I have reviewed all systems with patient. Pertinent positive and negative findings are listed below except for what is otherwise stated in the History of Present Illness.       Objective:    Labs  Lab Results   Component Value Date/Time    WBC 6.4 09/12/2024 11:03 AM    RBC 5.01 09/12/2024 11:03 AM    HEMOGLOBIN 15.4 09/12/2024 11:03 AM    HEMATOCRIT 45.9 09/12/2024 11:03 AM    MCV 91.6 09/12/2024 11:03 AM    MCH 30.7 09/12/2024 11:03 AM    MCHC 33.6 09/12/2024 11:03 AM    RDW 40.8 09/12/2024 11:03 AM    PLATELETCT 312 09/12/2024 11:03 AM    MPV 11.1 09/12/2024 11:03 AM    NEUTSPOLYS 61.90 07/19/2022 09:52 PM    LYMPHOCYTES 26.80 07/19/2022 09:52 PM    MONOCYTES 9.30 07/19/2022 09:52 PM    EOSINOPHILS 1.30 07/19/2022 09:52 PM    BASOPHILS 0.50 07/19/2022 09:52 PM    IMMGRAN 0.20 07/19/2022 09:52 PM    NRBC 0.00 07/19/2022 09:52 PM    NEUTS 6.24 07/19/2022 09:52 PM    LYMPHS 2.70 07/19/2022 09:52 PM    MONOS 0.94 (H) 07/19/2022 09:52 PM    EOS 0.13 07/19/2022 09:52 PM    BASO 0.05 07/19/2022 09:52 PM    IMMGRANAB 0.02 07/19/2022 09:52 PM    NRBCAB 0.00 07/19/2022 09:52 PM       No results found for: \"HBA1C\"      Physical Exam  /72 (BP Location: Left arm, Patient Position: Sitting, BP Cuff Size: Adult)   Pulse 99   Ht 5' 2\"   Wt 147 lb   LMP 11/22/2024   BMI 26.89 kg/m²    Patient's last menstrual period was 11/22/2024.  Body mass index is 26.89 kg/m².    General: alert, well appearing, and in no distress  Neurological: alert, oriented, normal speech, no focal findings or movement disorder noted  Respiratory: no tachypnea, retractions or cyanosis  Abdominal: soft, nontender, nondistended, no masses or organomegaly, no scars     Pelvic exam:   external genitalia: normal appearance  urinary system: urethral meatus normal  vaginal: normal " mucosa without prolapse or lesions  pelvic floor: nontender   cervix: normal appearance  adnexa: normal on bimanual exam  uterus: normal size, normal contour , mobile, non-tender        Female chaperone present.         Assessment:  19-year-old G0  Severe dysmenorrhea, pelvic pain outside of periods, dyschezia  Failed Depo and OCP  Normal ultrasound         Plan:  1. Dysmenorrhea  Discussed with the patient the diagnosis of endometriosis: the familial inheritance, progressive nature of the disease, possible associated infertility, diagnosis by laparoscopy and limitations of imaging. Reviewed with patient that endometriosis should be viewed as a chronic disease that requires a lifelong management plan with the goal of maximizing the use of medical treatment and avoiding repeated surgical procedures. Reviewed typical treatment options including medical management (OCPs, POPs, Lupron, Orlissa) and surgical options (excision of lesions, hysterectomy +/- BSO). The first line therapy for endometriosis is hormonal suppression and approximately 70% of women experience improvement in their pain with hormonal suppression. Reviewed that medical interventions do not improve fertility, diminish endometriomas, or treat complications of deep endometriosis such as ureteral obstruction. Surgical therapy is usually only considered when hormonal suppression is not effective, when it is not an option, or when there is a persistent pelvic mass suggestive of a large ovarian endometriotic cyst.   Surgical therapy with excision of endometriosis provides pain improvement for approximately 70% of women, but the risk of recurrent pain approaches 50% 2-4 years after surgery. Repetitive surgeries tend to be less successful than the initial surgery for the treatment of pain. Finally, we also discussed that even when endometriosis is identified in women with pelvic pain, it is often not the only cause of pain and other possible sources of pain  should be identified and treated.  Given the severity of her symptoms, and the fact that she failed 2 methods of birth control, I think is reasonable to proceed with diagnostic laparoscopy to rule out endometriosis.  After extensive counseling, we have decided to proceed with diagnostic laparoscopy, possible excision of endometriosis, insertion of Mirena IUD, any other indicated procedures.      Surgery counseling  After detailed counseling about all treatment options and shared decision-making, patient opts to proceed with surgery. Discussed all risks of surgery including but not limited to infection, bleeding, damage to bowel, bladder, ureters, vessels, nerves, conversion to laparotomy, possible need for blood transfusion. Patient is amenable to blood transfusion.    Specifically she was counselled as to what to expect on the day of surgery, counseled that medical students may be involved in her care. She will likely go home on the same day after the surgery.  Discussed trajectory of recovery, including maximizing NSAIDs and Tylenol. Discussed restrictions including heavy lifting that requires straining, driving while on narcotics, nothing in the vagina and no bathing/swimming for at least 2 weeks until evaluated in the office .  Return to work after 2 weeks discussed. She has expressed understanding about the procedure, risks, benefits, and recovery.    Endometriosis  If bowel or bladder are involved then I would recommend a second-stage procedure to address remaining endometriosis lesions.     Pelvic Pain  Discussed possibility that pain persists s/p surgery in which case would recommend repeat evaluation for alternate etiologies of chronic pelvic pain.        2. Cervical cancer screening  - THINPREP PAP ONLY; Future        Katelyn Helm MD

## 2024-12-03 NOTE — PROGRESS NOTES
Pt states painful periods, irregular cycles, and heavy flow.  Pt also states that they bleed for an entire year after getting two Depo shots last year.    BCM: none  Last pap: Today!  LMP: 11/22/24    Pharmacy verified.  Good ph# [phone] (home)

## 2024-12-09 ENCOUNTER — APPOINTMENT (OUTPATIENT)
Dept: ADMISSIONS | Facility: MEDICAL CENTER | Age: 19
End: 2024-12-09
Attending: STUDENT IN AN ORGANIZED HEALTH CARE EDUCATION/TRAINING PROGRAM
Payer: COMMERCIAL

## 2024-12-09 LAB — THINPREP PAP, CYTOLOGY NL11781: NORMAL

## 2024-12-09 RX ORDER — PROPRANOLOL HYDROCHLORIDE 10 MG/1
10 TABLET ORAL
COMMUNITY
Start: 2024-11-20

## 2024-12-09 ASSESSMENT — FIBROSIS 4 INDEX: FIB4 SCORE: 0.4

## 2024-12-10 ENCOUNTER — HOSPITAL ENCOUNTER (OUTPATIENT)
Facility: MEDICAL CENTER | Age: 19
End: 2024-12-10
Attending: STUDENT IN AN ORGANIZED HEALTH CARE EDUCATION/TRAINING PROGRAM | Admitting: STUDENT IN AN ORGANIZED HEALTH CARE EDUCATION/TRAINING PROGRAM
Payer: COMMERCIAL

## 2024-12-10 ENCOUNTER — PHARMACY VISIT (OUTPATIENT)
Dept: PHARMACY | Facility: MEDICAL CENTER | Age: 19
End: 2024-12-10
Payer: COMMERCIAL

## 2024-12-10 ENCOUNTER — ANESTHESIA EVENT (OUTPATIENT)
Dept: SURGERY | Facility: MEDICAL CENTER | Age: 19
End: 2024-12-10
Payer: COMMERCIAL

## 2024-12-10 ENCOUNTER — ANESTHESIA (OUTPATIENT)
Dept: SURGERY | Facility: MEDICAL CENTER | Age: 19
End: 2024-12-10
Payer: COMMERCIAL

## 2024-12-10 VITALS
SYSTOLIC BLOOD PRESSURE: 102 MMHG | HEIGHT: 62 IN | HEART RATE: 94 BPM | OXYGEN SATURATION: 97 % | DIASTOLIC BLOOD PRESSURE: 74 MMHG | TEMPERATURE: 97.3 F | WEIGHT: 142.86 LBS | BODY MASS INDEX: 26.29 KG/M2 | RESPIRATION RATE: 17 BRPM

## 2024-12-10 DIAGNOSIS — G89.18 ACUTE POST-OPERATIVE PAIN: ICD-10-CM

## 2024-12-10 PROBLEM — N94.6 DYSMENORRHEA: Status: ACTIVE | Noted: 2024-12-10

## 2024-12-10 PROBLEM — N80.9 ENDOMETRIOSIS: Status: ACTIVE | Noted: 2024-12-10

## 2024-12-10 PROBLEM — R10.2 PELVIC PAIN: Status: ACTIVE | Noted: 2024-12-10

## 2024-12-10 LAB — HCG UR QL: NEGATIVE

## 2024-12-10 PROCEDURE — 700102 HCHG RX REV CODE 250 W/ 637 OVERRIDE(OP): Performed by: STUDENT IN AN ORGANIZED HEALTH CARE EDUCATION/TRAINING PROGRAM

## 2024-12-10 PROCEDURE — 49320 DIAG LAPARO SEPARATE PROC: CPT | Mod: AS | Performed by: NURSE PRACTITIONER

## 2024-12-10 PROCEDURE — 160036 HCHG PACU - EA ADDL 30 MINS PHASE I: Performed by: STUDENT IN AN ORGANIZED HEALTH CARE EDUCATION/TRAINING PROGRAM

## 2024-12-10 PROCEDURE — 49320 DIAG LAPARO SEPARATE PROC: CPT | Performed by: STUDENT IN AN ORGANIZED HEALTH CARE EDUCATION/TRAINING PROGRAM

## 2024-12-10 PROCEDURE — 160009 HCHG ANES TIME/MIN: Performed by: STUDENT IN AN ORGANIZED HEALTH CARE EDUCATION/TRAINING PROGRAM

## 2024-12-10 PROCEDURE — 81025 URINE PREGNANCY TEST: CPT

## 2024-12-10 PROCEDURE — 700105 HCHG RX REV CODE 258: Performed by: STUDENT IN AN ORGANIZED HEALTH CARE EDUCATION/TRAINING PROGRAM

## 2024-12-10 PROCEDURE — 160046 HCHG PACU - 1ST 60 MINS PHASE II: Performed by: STUDENT IN AN ORGANIZED HEALTH CARE EDUCATION/TRAINING PROGRAM

## 2024-12-10 PROCEDURE — 700101 HCHG RX REV CODE 250: Performed by: ANESTHESIOLOGY

## 2024-12-10 PROCEDURE — 700111 HCHG RX REV CODE 636 W/ 250 OVERRIDE (IP): Performed by: ANESTHESIOLOGY

## 2024-12-10 PROCEDURE — 160002 HCHG RECOVERY MINUTES (STAT): Performed by: STUDENT IN AN ORGANIZED HEALTH CARE EDUCATION/TRAINING PROGRAM

## 2024-12-10 PROCEDURE — 700102 HCHG RX REV CODE 250 W/ 637 OVERRIDE(OP): Performed by: ANESTHESIOLOGY

## 2024-12-10 PROCEDURE — 700111 HCHG RX REV CODE 636 W/ 250 OVERRIDE (IP): Mod: JZ | Performed by: ANESTHESIOLOGY

## 2024-12-10 PROCEDURE — 58300 INSERT INTRAUTERINE DEVICE: CPT | Performed by: STUDENT IN AN ORGANIZED HEALTH CARE EDUCATION/TRAINING PROGRAM

## 2024-12-10 PROCEDURE — 160041 HCHG SURGERY MINUTES - EA ADDL 1 MIN LEVEL 4: Performed by: STUDENT IN AN ORGANIZED HEALTH CARE EDUCATION/TRAINING PROGRAM

## 2024-12-10 PROCEDURE — 160035 HCHG PACU - 1ST 60 MINS PHASE I: Performed by: STUDENT IN AN ORGANIZED HEALTH CARE EDUCATION/TRAINING PROGRAM

## 2024-12-10 PROCEDURE — 700111 HCHG RX REV CODE 636 W/ 250 OVERRIDE (IP): Performed by: STUDENT IN AN ORGANIZED HEALTH CARE EDUCATION/TRAINING PROGRAM

## 2024-12-10 PROCEDURE — A9270 NON-COVERED ITEM OR SERVICE: HCPCS | Performed by: ANESTHESIOLOGY

## 2024-12-10 PROCEDURE — 160048 HCHG OR STATISTICAL LEVEL 1-5: Performed by: STUDENT IN AN ORGANIZED HEALTH CARE EDUCATION/TRAINING PROGRAM

## 2024-12-10 PROCEDURE — 700111 HCHG RX REV CODE 636 W/ 250 OVERRIDE (IP)

## 2024-12-10 PROCEDURE — 160029 HCHG SURGERY MINUTES - 1ST 30 MINS LEVEL 4: Performed by: STUDENT IN AN ORGANIZED HEALTH CARE EDUCATION/TRAINING PROGRAM

## 2024-12-10 PROCEDURE — 160025 RECOVERY II MINUTES (STATS): Performed by: STUDENT IN AN ORGANIZED HEALTH CARE EDUCATION/TRAINING PROGRAM

## 2024-12-10 PROCEDURE — A9270 NON-COVERED ITEM OR SERVICE: HCPCS | Performed by: STUDENT IN AN ORGANIZED HEALTH CARE EDUCATION/TRAINING PROGRAM

## 2024-12-10 PROCEDURE — RXMED WILLOW AMBULATORY MEDICATION CHARGE: Performed by: NURSE PRACTITIONER

## 2024-12-10 RX ORDER — ONDANSETRON 2 MG/ML
4 INJECTION INTRAMUSCULAR; INTRAVENOUS
Status: COMPLETED | OUTPATIENT
Start: 2024-12-10 | End: 2024-12-10

## 2024-12-10 RX ORDER — IBUPROFEN 600 MG/1
600 TABLET, FILM COATED ORAL EVERY 6 HOURS PRN
Qty: 30 TABLET | Refills: 0 | Status: SHIPPED | OUTPATIENT
Start: 2024-12-10 | End: 2024-12-12

## 2024-12-10 RX ORDER — OXYCODONE HCL 5 MG/5 ML
10 SOLUTION, ORAL ORAL
Status: COMPLETED | OUTPATIENT
Start: 2024-12-10 | End: 2024-12-10

## 2024-12-10 RX ORDER — EPHEDRINE SULFATE 50 MG/ML
5 INJECTION, SOLUTION INTRAVENOUS
Status: DISCONTINUED | OUTPATIENT
Start: 2024-12-10 | End: 2024-12-10 | Stop reason: HOSPADM

## 2024-12-10 RX ORDER — SENNOSIDES 8.6 MG
650 CAPSULE ORAL EVERY 6 HOURS PRN
Qty: 20 TABLET | Refills: 0 | Status: CANCELLED | OUTPATIENT
Start: 2024-12-10 | End: 2024-12-15

## 2024-12-10 RX ORDER — LIDOCAINE HYDROCHLORIDE 20 MG/ML
INJECTION, SOLUTION EPIDURAL; INFILTRATION; INTRACAUDAL; PERINEURAL PRN
Status: DISCONTINUED | OUTPATIENT
Start: 2024-12-10 | End: 2024-12-10 | Stop reason: SURG

## 2024-12-10 RX ORDER — DEXAMETHASONE SODIUM PHOSPHATE 4 MG/ML
INJECTION, SOLUTION INTRA-ARTICULAR; INTRALESIONAL; INTRAMUSCULAR; INTRAVENOUS; SOFT TISSUE PRN
Status: DISCONTINUED | OUTPATIENT
Start: 2024-12-10 | End: 2024-12-10 | Stop reason: SURG

## 2024-12-10 RX ORDER — SCOLOPAMINE TRANSDERMAL SYSTEM 1 MG/1
1 PATCH, EXTENDED RELEASE TRANSDERMAL ONCE
Status: DISCONTINUED | OUTPATIENT
Start: 2024-12-10 | End: 2024-12-10 | Stop reason: HOSPADM

## 2024-12-10 RX ORDER — HALOPERIDOL 5 MG/ML
1 INJECTION INTRAMUSCULAR
Status: DISCONTINUED | OUTPATIENT
Start: 2024-12-10 | End: 2024-12-10 | Stop reason: HOSPADM

## 2024-12-10 RX ORDER — MEPERIDINE HYDROCHLORIDE 25 MG/ML
6.25 INJECTION INTRAMUSCULAR; INTRAVENOUS; SUBCUTANEOUS
Status: DISCONTINUED | OUTPATIENT
Start: 2024-12-10 | End: 2024-12-10 | Stop reason: HOSPADM

## 2024-12-10 RX ORDER — OXYCODONE HYDROCHLORIDE 5 MG/1
5 TABLET ORAL EVERY 6 HOURS PRN
Qty: 5 TABLET | Refills: 0 | Status: SHIPPED | OUTPATIENT
Start: 2024-12-10 | End: 2024-12-12 | Stop reason: SDUPTHER

## 2024-12-10 RX ORDER — DIPHENHYDRAMINE HYDROCHLORIDE 50 MG/ML
12.5 INJECTION INTRAMUSCULAR; INTRAVENOUS
Status: DISCONTINUED | OUTPATIENT
Start: 2024-12-10 | End: 2024-12-10 | Stop reason: HOSPADM

## 2024-12-10 RX ORDER — BUPIVACAINE HYDROCHLORIDE 2.5 MG/ML
INJECTION, SOLUTION EPIDURAL; INFILTRATION; INTRACAUDAL
Status: DISCONTINUED | OUTPATIENT
Start: 2024-12-10 | End: 2024-12-10 | Stop reason: HOSPADM

## 2024-12-10 RX ORDER — ACETAMINOPHEN 500 MG
1000 TABLET ORAL ONCE
Status: DISCONTINUED | OUTPATIENT
Start: 2024-12-10 | End: 2024-12-10 | Stop reason: HOSPADM

## 2024-12-10 RX ORDER — HYDROMORPHONE HYDROCHLORIDE 1 MG/ML
0.1 INJECTION, SOLUTION INTRAMUSCULAR; INTRAVENOUS; SUBCUTANEOUS
Status: DISCONTINUED | OUTPATIENT
Start: 2024-12-10 | End: 2024-12-10 | Stop reason: HOSPADM

## 2024-12-10 RX ORDER — SODIUM CHLORIDE, SODIUM LACTATE, POTASSIUM CHLORIDE, CALCIUM CHLORIDE 600; 310; 30; 20 MG/100ML; MG/100ML; MG/100ML; MG/100ML
INJECTION, SOLUTION INTRAVENOUS CONTINUOUS
Status: DISCONTINUED | OUTPATIENT
Start: 2024-12-10 | End: 2024-12-10 | Stop reason: HOSPADM

## 2024-12-10 RX ORDER — IBUPROFEN 200 MG
400 TABLET ORAL EVERY 6 HOURS PRN
COMMUNITY
End: 2024-12-12

## 2024-12-10 RX ORDER — ACETAMINOPHEN 500 MG
1000 TABLET ORAL
Status: COMPLETED | OUTPATIENT
Start: 2024-12-10 | End: 2024-12-10

## 2024-12-10 RX ORDER — ONDANSETRON 2 MG/ML
INJECTION INTRAMUSCULAR; INTRAVENOUS PRN
Status: DISCONTINUED | OUTPATIENT
Start: 2024-12-10 | End: 2024-12-10 | Stop reason: SURG

## 2024-12-10 RX ORDER — HEPARIN SODIUM 5000 [USP'U]/ML
5000 INJECTION, SOLUTION INTRAVENOUS; SUBCUTANEOUS ONCE
Status: COMPLETED | OUTPATIENT
Start: 2024-12-10 | End: 2024-12-10

## 2024-12-10 RX ORDER — ACETAMINOPHEN 500 MG
1000 TABLET ORAL EVERY 6 HOURS PRN
COMMUNITY

## 2024-12-10 RX ORDER — GABAPENTIN 300 MG/1
600 CAPSULE ORAL ONCE
Status: COMPLETED | OUTPATIENT
Start: 2024-12-10 | End: 2024-12-10

## 2024-12-10 RX ORDER — OXYCODONE HCL 5 MG/5 ML
5 SOLUTION, ORAL ORAL
Status: COMPLETED | OUTPATIENT
Start: 2024-12-10 | End: 2024-12-10

## 2024-12-10 RX ORDER — HYDROMORPHONE HYDROCHLORIDE 1 MG/ML
0.4 INJECTION, SOLUTION INTRAMUSCULAR; INTRAVENOUS; SUBCUTANEOUS
Status: DISCONTINUED | OUTPATIENT
Start: 2024-12-10 | End: 2024-12-10 | Stop reason: HOSPADM

## 2024-12-10 RX ORDER — KETOROLAC TROMETHAMINE 15 MG/ML
INJECTION, SOLUTION INTRAMUSCULAR; INTRAVENOUS PRN
Status: DISCONTINUED | OUTPATIENT
Start: 2024-12-10 | End: 2024-12-10 | Stop reason: SURG

## 2024-12-10 RX ORDER — ROCURONIUM BROMIDE 10 MG/ML
INJECTION, SOLUTION INTRAVENOUS PRN
Status: DISCONTINUED | OUTPATIENT
Start: 2024-12-10 | End: 2024-12-10 | Stop reason: SURG

## 2024-12-10 RX ORDER — HYDROMORPHONE HYDROCHLORIDE 1 MG/ML
0.2 INJECTION, SOLUTION INTRAMUSCULAR; INTRAVENOUS; SUBCUTANEOUS
Status: DISCONTINUED | OUTPATIENT
Start: 2024-12-10 | End: 2024-12-10 | Stop reason: HOSPADM

## 2024-12-10 RX ORDER — ACETAMINOPHEN 500 MG
500 TABLET ORAL EVERY 6 HOURS PRN
Qty: 30 TABLET | Refills: 0 | Status: SHIPPED | OUTPATIENT
Start: 2024-12-10

## 2024-12-10 RX ADMIN — KETOROLAC TROMETHAMINE 15 MG: 15 INJECTION, SOLUTION INTRAMUSCULAR; INTRAVENOUS at 14:45

## 2024-12-10 RX ADMIN — FENTANYL CITRATE 50 MCG: 50 INJECTION, SOLUTION INTRAMUSCULAR; INTRAVENOUS at 15:32

## 2024-12-10 RX ADMIN — SCOPOLAMINE 1 PATCH: 1.5 PATCH, EXTENDED RELEASE TRANSDERMAL at 12:33

## 2024-12-10 RX ADMIN — ACETAMINOPHEN 1000 MG: 500 TABLET ORAL at 12:34

## 2024-12-10 RX ADMIN — GABAPENTIN 600 MG: 300 CAPSULE ORAL at 12:33

## 2024-12-10 RX ADMIN — OXYCODONE HYDROCHLORIDE 10 MG: 5 SOLUTION ORAL at 15:22

## 2024-12-10 RX ADMIN — ONDANSETRON 4 MG: 2 INJECTION INTRAMUSCULAR; INTRAVENOUS at 15:22

## 2024-12-10 RX ADMIN — LIDOCAINE HYDROCHLORIDE 60 MG: 20 INJECTION, SOLUTION EPIDURAL; INFILTRATION; INTRACAUDAL; PERINEURAL at 14:06

## 2024-12-10 RX ADMIN — SODIUM CHLORIDE, POTASSIUM CHLORIDE, SODIUM LACTATE AND CALCIUM CHLORIDE: 600; 310; 30; 20 INJECTION, SOLUTION INTRAVENOUS at 13:58

## 2024-12-10 RX ADMIN — FENTANYL CITRATE 75 MCG: 50 INJECTION, SOLUTION INTRAMUSCULAR; INTRAVENOUS at 14:53

## 2024-12-10 RX ADMIN — PROPOFOL 200 MG: 10 INJECTION, EMULSION INTRAVENOUS at 14:06

## 2024-12-10 RX ADMIN — ROCURONIUM BROMIDE 50 MG: 50 INJECTION, SOLUTION INTRAVENOUS at 14:06

## 2024-12-10 RX ADMIN — FENTANYL CITRATE 75 MCG: 50 INJECTION, SOLUTION INTRAMUSCULAR; INTRAVENOUS at 14:00

## 2024-12-10 RX ADMIN — ONDANSETRON 4 MG: 2 INJECTION INTRAMUSCULAR; INTRAVENOUS at 14:45

## 2024-12-10 RX ADMIN — SUGAMMADEX 200 MG: 100 INJECTION, SOLUTION INTRAVENOUS at 14:48

## 2024-12-10 RX ADMIN — FENTANYL CITRATE 50 MCG: 50 INJECTION, SOLUTION INTRAMUSCULAR; INTRAVENOUS at 15:23

## 2024-12-10 RX ADMIN — HEPARIN SODIUM 5000 UNITS: 5000 INJECTION, SOLUTION INTRAVENOUS; SUBCUTANEOUS at 12:35

## 2024-12-10 RX ADMIN — DEXAMETHASONE SODIUM PHOSPHATE 8 MG: 4 INJECTION INTRA-ARTICULAR; INTRALESIONAL; INTRAMUSCULAR; INTRAVENOUS; SOFT TISSUE at 14:15

## 2024-12-10 ASSESSMENT — PAIN DESCRIPTION - PAIN TYPE
TYPE: SURGICAL PAIN

## 2024-12-10 ASSESSMENT — PAIN SCALES - GENERAL: PAIN_LEVEL: 1

## 2024-12-10 ASSESSMENT — FIBROSIS 4 INDEX: FIB4 SCORE: 0.4

## 2024-12-10 NOTE — OR NURSING
Patient A+Ox4. C/o abd pain and nausea.  Given po and iv meds as ordered  ice pack to abd in place.  Incisions sites x3 to mid abd all with dermabond closure clean and dry.  Plan for patient to discharge home.  Patient Mom updated with patient status and plan

## 2024-12-10 NOTE — H&P
Gynecology Preoperative History and Physical    HPI: Nancy Rodrigues is a 19 y.o. who presents for diagnostic laparoscopy, possible excision of endometriosis, levonorgestrel IUD insertion.   Patient is in her usual state of health without complaints.    OB History    Para Term  AB Living   0 0 0 0 0 0   SAB IAB Ectopic Molar Multiple Live Births   0 0 0 0 0 0     Past Medical History:   Diagnosis Date    Anesthesia     PONV     Anxiety     Depression     Environmental and seasonal allergies     History of migraine headaches      Past Surgical History:   Procedure Laterality Date    PB REMOVAL DEEP IMPLANT Left 10/24/2022    Procedure: LEFT FOOT REMOVAL INTERNAL FIXATION;  Surgeon: Moy Yost M.D.;  Location: [facility];  Service: Orthopedics    PB FUSION FOOT BONE,MIDTARSAL,1 JT Left 10/24/2022    Procedure: FUSION -Lapidus/Midfoot Fusion;  Surgeon: Moy Yost M.D.;  Location: [facility];  Service: Orthopedics    BUNIONECTOMY Left 2017    Procedure: BUNIONECTOMY/FOR REPAIR OF HALLUX VALGUS;  Surgeon: Moy Yost M.D.;  Location: [facility];  Service: Orthopedics    ORTHOPEDIC OSTEOTOMY Left 2017    Procedure: ORTHOPEDIC OSTEOTOMY/AND DELIA;  Surgeon: Moy Yost M.D.;  Location: [facility];  Service: Orthopedics    ORTHOPEDIC OSTEOTOMY Right 2017    Procedure: ORTHOPEDIC OSTEOTOMY-Proximal with bone grafting;  Surgeon: Moy Yost M.D.;  Location: [facility];  Service: Orthopedics    BUNIONECTOMY Right 2017    Procedure: BUNIONECTOMY - HALLUX VALGUS CORRECTION WITH DELIA;  Surgeon: Moy Yost M.D.;  Location: [facility];  Service: Orthopedics    OTHER      T&A - about 4 years old.       Current Facility-Administered Medications:     scopolamine (Transderm-Scop) patch 1 Patch, 1 Patch, Transdermal, Once, Katelyn Helm M.D., 1 Patch at 12/10/24 1233    lidocaine (Xylocaine) 1  "% injection 0.5 mL, 0.5 mL, Intradermal, Once PRN, Katelyn Helm M.D.    lactated ringers infusion, , Intravenous, Continuous, Katelyn Helm M.D.    acetaminophen (Tylenol) tablet 1,000 mg, 1,000 mg, Oral, Once, Tavo Ambrosio M.D.    levonorgestrel (Liletta) 52 mg (20.1 mcg/day) IUD 1 Each, 1 Each, Intrauterine, Once PRN, Katelyn Helm M.D.  Allergies   Allergen Reactions    Amoxicillin Swelling     Patient experienced swelling of the lips      Family History   Problem Relation Age of Onset    Hypertension Father     Breast Cancer Maternal Grandmother     Uterine cancer Maternal Grandmother     Cancer Maternal Grandfather     Breast Cancer Paternal Grandmother      Social History     Tobacco Use    Smoking status: Never    Smokeless tobacco: Never   Substance Use Topics    Alcohol use: Not Currently     Comment: \"occasional\"        Review of Systems:  Negative except as mentioned in the HPI.    OBJECTIVE  /59   Pulse 89   Temp 36.9 °C (98.4 °F) (Temporal)   Resp 17   Ht 1.575 m (5' 2\")   Wt 64.8 kg (142 lb 13.7 oz)   LMP 11/22/2024   SpO2 95%   Breastfeeding No   BMI 26.13 kg/m²    General: WD/WN, alert and conversant, lying in bed in NAD  Chest: Normal work of breathing on room air, equal chest rise bilaterally  Cardiac: RRR  Abdomen: Soft, non-distended, non-tender  Pelvis: Deferred for OR  Extremities: No cyanosis or edema  Skin: Warm and dry, no rashes    PLAN  - Heparin for DVT prophylaxis  - Preoperative antibiotics: not indicated   - Pregnancy test negative  - Consent signed   - Proceed to OR when ready    Katelyn Helm MD   "

## 2024-12-10 NOTE — ANESTHESIA PROCEDURE NOTES
Airway    Date/Time: 12/10/2024 2:07 PM    Performed by: Tavo Ambrosio M.D.  Authorized by: Tavo Ambrosio M.D.    Location:  OR  Urgency:  Elective  Indications for Airway Management:  Anesthesia      Spontaneous Ventilation: absent    Sedation Level:  Deep  Preoxygenated: Yes    Patient Position:  Sniffing  Mask Difficulty Assessment:  0 - not attempted  Final Airway Type:  Endotracheal airway  Final Endotracheal Airway:  ETT  Cuffed: Yes    Technique Used for Successful ETT Placement:  Direct laryngoscopy    Insertion Site:  Oral  Blade Type:  Brant  Laryngoscope Blade/Videolaryngoscope Blade Size:  3  ETT Size (mm):  7.0  Measured from:  Lips  ETT to Lips (cm):  22  Placement Verified by: auscultation and capnometry    Cormack-Lehane Classification:  Grade IIa - partial view of glottis  Number of Attempts at Approach:  1

## 2024-12-10 NOTE — ANESTHESIA PREPROCEDURE EVALUATION
Case: [id number] Date/Time: 12/10/24 1614    Procedures:       LAPAROSCOPIC EXCISION OF ENDOMETRIOSIS, INTRAUTERINE DEVICE PLACEMENT      INSERTION, IUD    Pre-op diagnosis: ENDOMETRIOSIS    Location: [facility] OR 08 / SURGERY [facility]    Surgeons: Katelyn Helm M.D.            Relevant Problems   NEURO   (positive) Intractable migraine without aura and without status migrainosus      CARDIAC   (positive) Intractable migraine without aura and without status migrainosus      Other   (positive) Dysmenorrhea   (positive) Endometriosis   (positive) Moderate major depression (HCC)   (positive) PMDD (premenstrual dysphoric disorder)   (positive) Pelvic pain   (positive) Periauricular lymphadenopathy       Physical Exam    Airway   Mallampati: II  TM distance: >3 FB  Neck ROM: full       Cardiovascular - normal exam  Rhythm: regular  Rate: normal  (-) murmur     Dental - normal exam           Pulmonary - normal exam  Breath sounds clear to auscultation     Abdominal    Neurological - normal exam                   Anesthesia Plan    ASA 2       Plan - general       Airway plan will be ETT          Induction: intravenous    Postoperative Plan: Postoperative administration of opioids is intended.    Pertinent diagnostic labs and testing reviewed    Informed Consent:    Anesthetic plan and risks discussed with patient.    Use of blood products discussed with: patient whom consented to blood products.

## 2024-12-10 NOTE — PROGRESS NOTES
Medication history reviewed with PT at bedside    [facility] is complete per PT reporting    Allergies reviewed.     Patient denies any outpatient antibiotics in the last 30 days.     Patient is not taking anticoagulants.    Preferred pharmacy for this visit - [facility] on [address] ([phone])

## 2024-12-10 NOTE — OP REPORT
OPERATIVE REPORT    Name: Nancy Rodrigues   : 2005   MRN: [id number]      PRE-OP DIAGNOSIS:   Dysmenorrhea            POST-OP DIAGNOSIS:   Dysmenorrhea              PROCEDURE:   Procedure(s):  INTRAUTERINE DEVICE PLACEMENT  DIAGNOSTIC LAPAROPSCOPY              SURGEON:   Katelyn Helm MD - Primary  Rand VELASCO-RNFA - Assist             ANESTHESIA: General           FINDINGS:   Laparoscopy  - Systematic review of pelvis noted no evidence of endometriosis   - Normal upper abdominal survey with normal appearance of liver, gallbladder, stomach, diaphragm  - Normal uterus, fallopian tubes and ovaries    ESTIMATED BLOOD LOSS: 5ml           DRAINS: none                  SPECIMENS: * No specimens in log *           IMPLANTS: Liletta IUD LOT [id number], exp 2029           COMPLICATIONS: :None           DISPOSITION: Discharge           CONDITION: Stable           INDICATION FOR SURGERY:   19 y.o.  with dysmenorrhea, pelvic pain who presents for diagnostic laparoscopy, LNG IUD insertion. Patient understood all risks including but not limited to infection, bleeding, damage to bowel, bladder, ureters, vessels, nerves, conversion to laparotomy, and blood transfusion. She had discussed all alternatives and decision was made to proceed with the above procedure. She signed informed consent.       TECHNIQUE:   The patient was taken to the operating room where general anesthesia was obtained without difficulty. The patient was placed in dorsal lithotomy position with feet in yellow fin stirrups. She was prepped and draped in the usual sterile fashion with arms tucked at the side.    A time-out was called before the start of the procedure. Masterson catheter was placed. A speculum was placed in the vagina. The cervix was grasped with single-tooth tenaculum anteriorly, sounded to 8cm, and a uterine manipulator was placed inside the uterus.     Attention was shifted to the patient's abdomen. 0.25% marcaine was injected  at the left upper quadrant and a small stab incision was made. The Veress needle was inserted with correct intraperitoneal placement confirmed with a positive saline drop test and low opening pressure. The abdomen was then insufflated until dullness to percussion was no longer heard over the liver's edge.     A 5 mm incision was made in the umbilicus. A trocar was inserted through this incision under direct visualization with a laparoscope. No bowel injury or vascular injury were noted at the point of entry. The patient was then placed into Trendelenburg. A 5mm port was placed on the umbilicus. An additional 5mm port was then placed in the right lower quadrant with careful attention to avoid the inferior Epigastric vessels.  Initial survey of the abdomen revealed the above findings.    The uterine manipulator was removed. The Lilleta IUD was placed under direct laparoscopic visualization.   The abdomen was dessuflated and the trocars removed.The skin incisions were closed with 4.0 monocryl and topped with Dermabond. All instruments were removed from the vagina. Masterson was removed.     All counts were correct.  The patient was then wakened from general anesthesia easily, and brought to the PACU in stable condition. Anticipate discharge home.         Katelyn Helm MD  Minimally Invasive Gynecologic Surgery

## 2024-12-10 NOTE — DISCHARGE INSTRUCTIONS
HOME CARE INSTRUCTIONS    ACTIVITY: Rest and take it easy for the first 24 hours.  A responsible adult is recommended to remain with you during that time.  It is normal to feel sleepy.  We encourage you to not do anything that requires balance, judgment or coordination.    FOR 24 HOURS DO NOT:  Drive, operate machinery or run household appliances.  Drink beer or alcoholic beverages.  Make important decisions or sign legal documents.      DIET: To avoid nausea, slowly advance diet as tolerated, avoiding spicy or greasy foods for the first day.  Add more substantial food to your diet according to your physician's instructions.  INCREASE FLUIDS AND FIBER TO AVOID CONSTIPATION.    SURGICAL DRESSING/BATHING: ok to shower    MEDICATIONS: Resume taking daily medication.  Take prescribed pain medication with food.  If no medication is prescribed, you may take non-aspirin pain medication if needed.  PAIN MEDICATION CAN BE VERY CONSTIPATING.  Take a stool softener or laxative such as senokot, pericolace, or milk of magnesia if needed.    Prescription given for  Oxycodone.  Last pain medication given at Oxycodone  3:22 PM    A follow-up appointment should be arranged with your doctor in 1-2 weeks; call to schedule.    You should CALL YOUR PHYSICIAN if you develop:  Fever greater than 101 degrees F.  Pain not relieved by medication, or persistent nausea or vomiting.  Excessive bleeding (blood soaking through dressing) or unexpected drainage from the wound.  Extreme redness or swelling around the incision site, drainage of pus or foul smelling drainage.  Inability to urinate or empty your bladder within 8 hours.  Problems with breathing or chest pain.    You should call 911 if you develop problems with breathing or chest pain.  If you are unable to contact your doctor or surgical center, you should go to the nearest emergency room or urgent care center.  Physician's telephone #: [phone]    MILD FLU-LIKE SYMPTOMS ARE NORMAL.   YOU MAY EXPERIENCE GENERALIZED MUSCLE ACHES, THROAT IRRITATION, HEADACHE AND/OR SOME NAUSEA.    If any questions arise, call your doctor.  If your doctor is not available, please feel free to call the Surgical Center at [phone].  The Center is open Monday through Friday from 7AM to 7PM.      A registered nurse may call you a few days after your surgery to see how you are doing after your procedure.    You may also receive a survey in the mail within the next two weeks and we ask that you take a few moments to complete the survey and return it to us.  Our goal is to provide you with very good care and we value your comments.     Depression / Suicide Risk    As you are discharged from this [facility] facility, it is important to learn how to keep safe from harming yourself.    Recognize the warning signs:  Abrupt changes in personality, positive or negative- including increase in energy   Giving away possessions  Change in eating patterns- significant weight changes-  positive or negative  Change in sleeping patterns- unable to sleep or sleeping all the time   Unwillingness or inability to communicate  Depression  Unusual sadness, discouragement and loneliness  Talk of wanting to die  Neglect of personal appearance   Rebelliousness- reckless behavior  Withdrawal from people/activities they love  Confusion- inability to concentrate     If you or a loved one observes any of these behaviors or has concerns about self-harm, here's what you can do:  Talk about it- your feelings and reasons for harming yourself  Remove any means that you might use to hurt yourself (examples: pills, rope, extension cords, firearm)  Get professional help from the community (Mental Health, Substance Abuse, psychological counseling)  Do not be alone:Call your Safe Contact- someone whom you trust who will be there for you.  Call your local CRISIS HOTLINE 492-4734 or 134-091-9080  Call your local Children's Mobile Crisis Response Team  [facility] (827) 306-3279 or www.[employer]  Call the toll free National Suicide Prevention Hotlines   National Suicide Prevention Lifeline 755-406-ZWIX (5896)  [place] [employer] Network 800-SUICIDE (913-3666)    I acknowledge receipt and understanding of these Home Care instructions.

## 2024-12-10 NOTE — ANESTHESIA POSTPROCEDURE EVALUATION
Patient: Nancy Rodrigues    Procedure Summary       Date: 12/10/24 Room / Location: [facility] 08 / SURGERY [facility]    Anesthesia Start: 1358 Anesthesia Stop: 1505    Procedures:       INTRAUTERINE DEVICE PLACEMENT (Vagina )      DIAGNOSTIC LAPAROPSCOPY (Abdomen) Diagnosis: (DYSMENORRHEA)    Surgeons: Katelyn Helm M.D. Responsible Provider: Tavo Ambrosio M.D.    Anesthesia Type: general ASA Status: 2            Final Anesthesia Type: general  Last vitals  BP   Blood Pressure: 119/59    Temp   36.9 °C (98.4 °F)    Pulse   89   Resp   17    SpO2   95 %      Anesthesia Post Evaluation    Patient location during evaluation: PACU  Patient participation: complete - patient participated  Level of consciousness: sleepy but conscious  Pain score: 1    Airway patency: patent  Anesthetic complications: no  Cardiovascular status: hemodynamically stable  Respiratory status: acceptable  Hydration status: euvolemic    PONV: none          There were no known notable events for this encounter.     Nurse Pain Score: 0 (NPRS)

## 2024-12-10 NOTE — ANESTHESIA TIME REPORT
Anesthesia Start and Stop Event Times       Date Time Event    12/10/2024 1340 Ready for Procedure     1358 Anesthesia Start     1505 Anesthesia Stop          Responsible Staff  12/10/24      Name Role Begin End    Tavo Ambrosio M.D. Anesth 1358 1505          Overtime Reason:  overtime    Comments:

## 2024-12-11 ENCOUNTER — HOSPITAL ENCOUNTER (EMERGENCY)
Facility: MEDICAL CENTER | Age: 19
End: 2024-12-11
Attending: EMERGENCY MEDICINE
Payer: COMMERCIAL

## 2024-12-11 ENCOUNTER — APPOINTMENT (OUTPATIENT)
Dept: RADIOLOGY | Facility: MEDICAL CENTER | Age: 19
End: 2024-12-11
Attending: EMERGENCY MEDICINE
Payer: COMMERCIAL

## 2024-12-11 ENCOUNTER — GYNECOLOGY VISIT (OUTPATIENT)
Dept: GYNECOLOGY | Facility: CLINIC | Age: 19
End: 2024-12-11
Payer: COMMERCIAL

## 2024-12-11 VITALS
DIASTOLIC BLOOD PRESSURE: 68 MMHG | SYSTOLIC BLOOD PRESSURE: 118 MMHG | HEIGHT: 62 IN | BODY MASS INDEX: 26.13 KG/M2 | WEIGHT: 142 LBS | HEART RATE: 74 BPM

## 2024-12-11 VITALS
HEIGHT: 62 IN | SYSTOLIC BLOOD PRESSURE: 89 MMHG | DIASTOLIC BLOOD PRESSURE: 56 MMHG | BODY MASS INDEX: 27.14 KG/M2 | OXYGEN SATURATION: 96 % | HEART RATE: 73 BPM | WEIGHT: 147.49 LBS | TEMPERATURE: 97.8 F | RESPIRATION RATE: 20 BRPM

## 2024-12-11 DIAGNOSIS — S30.1XXA ABDOMINAL WALL HEMATOMA, INITIAL ENCOUNTER: ICD-10-CM

## 2024-12-11 DIAGNOSIS — T14.8XXA HEMATOMA: ICD-10-CM

## 2024-12-11 DIAGNOSIS — G89.18 POST-OP PAIN: ICD-10-CM

## 2024-12-11 LAB
ALBUMIN SERPL BCP-MCNC: 4.1 G/DL (ref 3.2–4.9)
ALBUMIN/GLOB SERPL: 1.6 G/DL
ALP SERPL-CCNC: 76 U/L (ref 30–99)
ALT SERPL-CCNC: 15 U/L (ref 2–50)
ANION GAP SERPL CALC-SCNC: 8 MMOL/L (ref 7–16)
AST SERPL-CCNC: 15 U/L (ref 12–45)
BASOPHILS # BLD AUTO: 0.3 % (ref 0–1.8)
BASOPHILS # BLD: 0.03 K/UL (ref 0–0.12)
BILIRUB SERPL-MCNC: 0.3 MG/DL (ref 0.1–1.5)
BUN SERPL-MCNC: 8 MG/DL (ref 8–22)
CALCIUM ALBUM COR SERPL-MCNC: 8.4 MG/DL (ref 8.5–10.5)
CALCIUM SERPL-MCNC: 8.5 MG/DL (ref 8.5–10.5)
CHLORIDE SERPL-SCNC: 103 MMOL/L (ref 96–112)
CO2 SERPL-SCNC: 27 MMOL/L (ref 20–33)
CREAT SERPL-MCNC: 0.76 MG/DL (ref 0.5–1.4)
EOSINOPHIL # BLD AUTO: 0.03 K/UL (ref 0–0.51)
EOSINOPHIL NFR BLD: 0.3 % (ref 0–6.9)
ERYTHROCYTE [DISTWIDTH] IN BLOOD BY AUTOMATED COUNT: 41.1 FL (ref 35.9–50)
GFR SERPLBLD CREATININE-BSD FMLA CKD-EPI: 114 ML/MIN/1.73 M 2
GLOBULIN SER CALC-MCNC: 2.5 G/DL (ref 1.9–3.5)
GLUCOSE SERPL-MCNC: 92 MG/DL (ref 65–99)
HCT VFR BLD AUTO: 39.4 % (ref 37–47)
HGB BLD-MCNC: 12.9 G/DL (ref 12–16)
IMM GRANULOCYTES # BLD AUTO: 0.04 K/UL (ref 0–0.11)
IMM GRANULOCYTES NFR BLD AUTO: 0.3 % (ref 0–0.9)
LYMPHOCYTES # BLD AUTO: 2.41 K/UL (ref 1–4.8)
LYMPHOCYTES NFR BLD: 20.4 % (ref 22–41)
MCH RBC QN AUTO: 30 PG (ref 27–33)
MCHC RBC AUTO-ENTMCNC: 32.7 G/DL (ref 32.2–35.5)
MCV RBC AUTO: 91.6 FL (ref 81.4–97.8)
MONOCYTES # BLD AUTO: 1.29 K/UL (ref 0–0.85)
MONOCYTES NFR BLD AUTO: 10.9 % (ref 0–13.4)
NEUTROPHILS # BLD AUTO: 8.04 K/UL (ref 1.82–7.42)
NEUTROPHILS NFR BLD: 67.8 % (ref 44–72)
NRBC # BLD AUTO: 0 K/UL
NRBC BLD-RTO: 0 /100 WBC (ref 0–0.2)
PLATELET # BLD AUTO: 278 K/UL (ref 164–446)
PMV BLD AUTO: 11 FL (ref 9–12.9)
POTASSIUM SERPL-SCNC: 3.4 MMOL/L (ref 3.6–5.5)
PROT SERPL-MCNC: 6.6 G/DL (ref 6–8.2)
RBC # BLD AUTO: 4.3 M/UL (ref 4.2–5.4)
SODIUM SERPL-SCNC: 138 MMOL/L (ref 135–145)
WBC # BLD AUTO: 11.8 K/UL (ref 4.8–10.8)

## 2024-12-11 PROCEDURE — 96374 THER/PROPH/DIAG INJ IV PUSH: CPT

## 2024-12-11 PROCEDURE — 96375 TX/PRO/DX INJ NEW DRUG ADDON: CPT

## 2024-12-11 PROCEDURE — 36415 COLL VENOUS BLD VENIPUNCTURE: CPT

## 2024-12-11 PROCEDURE — 80053 COMPREHEN METABOLIC PANEL: CPT

## 2024-12-11 PROCEDURE — 99999 PR NO CHARGE: CPT | Performed by: STUDENT IN AN ORGANIZED HEALTH CARE EDUCATION/TRAINING PROGRAM

## 2024-12-11 PROCEDURE — 74177 CT ABD & PELVIS W/CONTRAST: CPT

## 2024-12-11 PROCEDURE — 85025 COMPLETE CBC W/AUTO DIFF WBC: CPT

## 2024-12-11 PROCEDURE — 99284 EMERGENCY DEPT VISIT MOD MDM: CPT

## 2024-12-11 PROCEDURE — 700117 HCHG RX CONTRAST REV CODE 255: Performed by: EMERGENCY MEDICINE

## 2024-12-11 PROCEDURE — 700111 HCHG RX REV CODE 636 W/ 250 OVERRIDE (IP): Mod: JZ | Performed by: EMERGENCY MEDICINE

## 2024-12-11 RX ORDER — HYDROMORPHONE HYDROCHLORIDE 1 MG/ML
0.5 INJECTION, SOLUTION INTRAMUSCULAR; INTRAVENOUS; SUBCUTANEOUS ONCE
Status: COMPLETED | OUTPATIENT
Start: 2024-12-11 | End: 2024-12-11

## 2024-12-11 RX ORDER — KETOROLAC TROMETHAMINE 15 MG/ML
15 INJECTION, SOLUTION INTRAMUSCULAR; INTRAVENOUS ONCE
Status: COMPLETED | OUTPATIENT
Start: 2024-12-11 | End: 2024-12-11

## 2024-12-11 RX ORDER — ONDANSETRON 2 MG/ML
4 INJECTION INTRAMUSCULAR; INTRAVENOUS ONCE
Status: COMPLETED | OUTPATIENT
Start: 2024-12-11 | End: 2024-12-11

## 2024-12-11 RX ADMIN — HYDROMORPHONE HYDROCHLORIDE 0.5 MG: 1 INJECTION, SOLUTION INTRAMUSCULAR; INTRAVENOUS; SUBCUTANEOUS at 14:55

## 2024-12-11 RX ADMIN — FENTANYL CITRATE 100 MCG: 50 INJECTION, SOLUTION INTRAMUSCULAR; INTRAVENOUS at 12:22

## 2024-12-11 RX ADMIN — KETOROLAC TROMETHAMINE 15 MG: 15 INJECTION, SOLUTION INTRAMUSCULAR; INTRAVENOUS at 15:55

## 2024-12-11 RX ADMIN — ONDANSETRON 4 MG: 2 INJECTION INTRAMUSCULAR; INTRAVENOUS at 12:23

## 2024-12-11 RX ADMIN — IOHEXOL 100 ML: 350 INJECTION, SOLUTION INTRAVENOUS at 14:45

## 2024-12-11 ASSESSMENT — FIBROSIS 4 INDEX
FIB4 SCORE: 0.4
FIB4 SCORE: 0.4

## 2024-12-11 ASSESSMENT — PAIN DESCRIPTION - PAIN TYPE
TYPE: ACUTE PAIN
TYPE: ACUTE PAIN;SURGICAL PAIN
TYPE: ACUTE PAIN

## 2024-12-11 NOTE — ED NOTES
Pts family member came forward to triage desk saying that pts incision site is now draining. Pt provided gauze and told that the pt will be checked by triage RN.

## 2024-12-11 NOTE — ED TRIAGE NOTES
Chief Complaint   Patient presents with    Post-Op Complications     Pt sent by OB/GYN, has laparoscopic surgery yesterday, last night one of the incisions began to open & noticed increased swelling throughout the night and this morning. Sent for CT scan. +nausea.      Pt ambulates to triage for above.     Pt returned to [facility], educated on triage process.

## 2024-12-11 NOTE — ED PROVIDER NOTES
"ED Provider Note    ED PHYSICIAN NOTE    CHIEF COMPLAINT  Chief Complaint   Patient presents with    Post-Op Complications     Pt sent by OB/GYN, has laparoscopic surgery yesterday, last night one of the incisions began to open & noticed increased swelling throughout the night and this morning. Sent for CT scan. +nausea.        EXTERNAL RECORDS REVIEWED  Outpatient Notes Dr. Helm office visit from today, patient had a diagnostic lap uroscopy, IUD placements postop day 1, came in today with bruising to the port site and pain if findings of hematoma on exam was sent to the emergency department for CT    HPI/ROS  LIMITATION TO HISTORY   Select: : None  OUTSIDE HISTORIAN(S):  none    Nancy Rodrigues is a 19 y.o. person who presents with abdominal pain as well as nausea.  Patient had laparoscopic surgery and IUD placement yesterday day with OB/GYN.  She reports that through the night pain was poorly controlled and she noticed some bruising and swelling to one of her incision site, as well as some slight bleeding there and additional bleeding that began this morning in her umbilicus at the other site.  She reports no other abdominal pain.  She does report some nausea as well.  No fevers or chills.  No dysuria.  No other recent illness.  She was seen at gynecology office and referred here    PAST MEDICAL HISTORY  Past Medical History:   Diagnosis Date    Anesthesia     PONV     Anxiety     Depression     Environmental and seasonal allergies     History of migraine headaches        SOCIAL HISTORY  Social History     Tobacco Use    Smoking status: Never    Smokeless tobacco: Never   Vaping Use    Vaping status: Every Day    Substances: Nicotine, Flavoring    Devices: Disposable   Substance Use Topics    Alcohol use: Not Currently     Comment: \"occasional\"    Drug use: Not Currently       CURRENT MEDICATIONS  Home Medications    **Home medications have not yet been reviewed for this encounter**       Audit from Redirected " "Encounters    **Home medications have not yet been reviewed for this encounter**         ALLERGIES  Allergies   Allergen Reactions    Amoxicillin Swelling     Patient experienced swelling of the lips       PHYSICAL EXAM  VITAL SIGNS: BP 97/55   Pulse 69   Temp 36.6 °C (97.8 °F) (Temporal)   Resp 17   Ht 1.575 m (5' 2\")   Wt 66.9 kg (147 lb 7.8 oz)   LMP 11/22/2024   SpO2 95%   BMI 26.98 kg/m²    Constitutional: Awake and alert uncomfortable  HENT: Normal inspection, no signs of trauma  Eyes: Normal inspection, Pupils equal, non-icteric  Neck: Grossly normal range of motion. No stridor  Cardiovascular: Regular rate and rhythm, no murmurs.  Symmetric peripheral pulses at radial  Thorax & Lungs: No respiratory distress, No wheezing, No rales, No rhonchi, No chest tenderness.   Abdomen:  soft, non-distended, bruising noted with hematoma over the right lower quadrant at laparoscopic site with some associated tenderness as well as over the umbilicus, there is no induration, no surrounding erythema, otherwise no tenderness in the abdomen, no mass  Skin: No obvious rash. Warm. Dry.   Back: No tenderness, No CVA tenderness.   Extremities: No cyanosis, no edema  Neurologic: AO3, Grossly normal  Psychiatric: Normal affect for situation        DIAGNOSTIC STUDIES / PROCEDURES  LABS/EKG  Labs Reviewed   CBC WITH DIFFERENTIAL - Abnormal; Notable for the following components:       Result Value    WBC 11.8 (*)     Lymphocytes 20.40 (*)     Neutrophils (Absolute) 8.04 (*)     Monos (Absolute) 1.29 (*)     All other components within normal limits   COMP METABOLIC PANEL - Abnormal; Notable for the following components:    Potassium 3.4 (*)     Correct Calcium 8.4 (*)     All other components within normal limits   ESTIMATED GFR       I have independently interpreted this EKG as documented above      RADIOLOGY  I have independently interpreted the diagnostic imaging associated with this visit and am waiting the final reading " from the radiologist.   My preliminary interpretation is as follows: No abscess    CT-ABDOMEN-PELVIS WITH   Final Result      1.  Small tubular postoperative hematoma in the subcutaneous tissues of the right lower quadrant. Small adjacent collection of air in the rectus musculature in the right lower quadrant likely postoperative.      2.  No evidence of bowel obstruction or significant intra-abdominal fluid collection.            COURSE & MEDICAL DECISION MAKING    INITIAL ASSESSMENT, COURSE AND PLAN  Care Narrative: 11:57 AM  Patient presents with abdominal pain after recent surgery.  Exam does suggest more hematoma however consideration for intraperitoneal bleed seems unlikely to be infection given the nature of her symptoms and the time course of this but this was considered.  Have ordered for CT scan, diagnostic labs, Zofran and fentanyl for her symptoms    Case discussed with Dr. Helm from gynecology.  Given patient's exam in office as well as her diagnostics performed here today no findings of complication and patient can follow-up with her in the office or as needed for further pain medications    3:45 PM  Patient is reevaluated, pain is significantly improved.  Patient and family are comfortable with discharge plan        Interventions  Medications   ketorolac (Toradol) 15 MG/ML injection 15 mg (has no administration in time range)   ondansetron (Zofran) syringe/vial injection 4 mg (4 mg Intravenous Given 12/11/24 1223)   fentaNYL (Sublimaze) injection 100 mcg (100 mcg Intravenous Given 12/11/24 1222)   iohexol (OMNIPAQUE) 350 mg/mL (IV) (100 mL Intravenous Given 12/11/24 1445)   HYDROmorphone (Dilaudid) injection 0.5 mg (0.5 mg Intravenous Given 12/11/24 1455)            PROBLEM LIST    # Abdominal wall hematoma.  Patient with laparoscopic surgery through gynecology, now with some abdominal wall pain found to have abdominal wall hematoma.  No findings of other intra-abdominal process or postoperative  infection.  She feels comfortable with current outpatient pain management plan and will follow-up with her gynecologist      DISPOSITION AND DISCUSSIONS  I have discussed management of the patient with the following physicians and BRITTNEE's:  as noted above      Barriers to care at this time, including but not limited to:  none .     Prescription drugs considered and/or prescribed:   Already with prescriptions for oxycodone, as well as taking Tylenol and ibuprofen  Prescribed   New Prescriptions    No medications on file       The patient will return for new or worsening symptoms and is stable at the time of discharge.    The patient is referred to a primary physician for blood pressure management, diabetic screening, and for all other preventative health concerns.        DISPOSITION:  Patient will be discharged home in stable condition.    FOLLOW UP:  Katelyn Helm M.D.  [address] [facility] [address]  [facility] [address]  [phone]      As needed      OUTPATIENT MEDICATIONS:  New Prescriptions    No medications on file         FINAL DIAGNOSIS  1. Post-op pain        2. Abdominal wall hematoma, initial encounter               This dictation was created using voice recognition software. The accuracy of the dictation is limited to the abilities of the software. I expect there may be some errors of grammar and possibly content. The nursing notes were reviewed and certain aspects of this information were incorporated into this note.    Electronically signed by: Phong Ko M.D., 12/11/2024

## 2024-12-11 NOTE — ED NOTES
Rounded on pt. Pt sleeping. Bed locked and in lowest position. Call light within reach. Respirations equal and unlabored. No further needs at this time.

## 2024-12-11 NOTE — OR NURSING
Pt arrived to PACU stable. Pt remains A&Ox4, VSS, incisions are CDI,all belongings with pt. mom called and updated on pt status and plan of care. Pt currently resting in bed in no acute distress. All scripts going home with pt

## 2024-12-11 NOTE — PROGRESS NOTES
Postoperative Visit - Minimally Invasive Gynecologic Surgery     HPI: 19 y.o. female who presents today for a post-operative visit. Pod#1 s/p diagnostic LSC, IUD placement. EBL minimal. No intra-op complications. She called me last night with c/o bruising on RLQ port site, swelling, pain, nausea. Reports sx have worsen today.  She has been taking ibuprofen, tylenol and oxycodone 5mg without relief. Has nausea, no emesis. Tolerating diet, passing flatus, normal bladder function.         Physical exam:  Vitals:    12/11/24 0913   BP: 118/68   Pulse: 74     General: NAD  Abdominal exam: RLQ port with hematoma, not draining. Very tender to palpation. Abdomen otherwise soft, not acute. Other incisions healing appropriately.      Assessment: 19 y.o. female here for postop visit c/b port site hematoma. Pt hemodynamically stable, non acute abdomen.    Plan:  As hematoma has increase in size and pain has been worsening and not relieved by PO meds, will send patient to ED for pain management and further investigation. Recommend CT A/P to rule out intra-abdominal process (low suspicion) and further evaluation of hematoma. Will f/u on ED plan and touch base with patient later today.      Katelyn Helm MD

## 2024-12-11 NOTE — PROGRESS NOTES
Pt here for post op incision check   Pt states pain med not working, haven't slept, swelling at incision   Pharmacy confirmed.   Good # [phone]

## 2024-12-12 ENCOUNTER — TELEPHONE (OUTPATIENT)
Dept: OBGYN | Facility: CLINIC | Age: 19
End: 2024-12-12
Payer: COMMERCIAL

## 2024-12-12 DIAGNOSIS — G89.18 ACUTE POST-OPERATIVE PAIN: ICD-10-CM

## 2024-12-12 RX ORDER — OXYCODONE HYDROCHLORIDE 5 MG/1
5 TABLET ORAL EVERY 6 HOURS PRN
Qty: 10 TABLET | Refills: 0 | Status: SHIPPED | OUTPATIENT
Start: 2024-12-12 | End: 2024-12-19

## 2024-12-12 RX ORDER — GABAPENTIN 300 MG/1
300 CAPSULE ORAL 3 TIMES DAILY
Qty: 30 CAPSULE | Refills: 0 | Status: SHIPPED | OUTPATIENT
Start: 2024-12-12 | End: 2024-12-22

## 2024-12-12 RX ORDER — KETOROLAC TROMETHAMINE 10 MG/1
10 TABLET, FILM COATED ORAL EVERY 6 HOURS
Qty: 12 TABLET | Refills: 0 | Status: SHIPPED | OUTPATIENT
Start: 2024-12-12 | End: 2024-12-15

## 2024-12-12 NOTE — TELEPHONE ENCOUNTER
Pt called to get a refill on her oxycodone, she stated she had reached out yesterday for a refill but has not heard from the pharmacy. She is s/p INTRAUTERINE DEVICE PLACEMENT and DIAGNOSTIC LAPAROPSCOPY on 12/10/24.

## 2024-12-12 NOTE — TELEPHONE ENCOUNTER
Pt's dad called for an update on her meds, ( regarding telephone encounter 12/12/24 at 8:00am) let pt's dad know I would be sending the message to the MA and they would get back to him or daughter once the med is refilled. Pt's dad understood.

## 2024-12-12 NOTE — PROGRESS NOTES
Called patient to check on postop recovery.  She ran out of oxycodone, pain is intense.  Pain was manageable yesterday.  Tolerating regular diet, voiding, passing gas, no fever.  Right lower quadrant incision draining a little bit.  Recommend the following regimen: Scheduled Tylenol, scheduled Toradol, scheduled gabapentin, oxycodone as needed.  Prescription sent . She should not take ibuprofen with Toradol.  Will call patient later today to see how she is doing.

## 2024-12-24 NOTE — PROGRESS NOTES
Postoperative Visit - Minimally Invasive Gynecologic Surgery     HPI: 19 y.o. female who presents today for a post-operative visit.  Reports right lower quadrant incision hematoma has resolved, still has a little bit of induration at the causes some discomfort when she moves.  Does not require pain medication.  Overall feeling well, normal energy level.  Had heavy bleeding after surgery which has decreased to occasional spotting.    Surgery performed: diagnostic laparoscopy, Liletta IUD placement on 12/10  Complications: Wound hematoma    Pathology report: None    Physical exam:  Vitals:    12/26/24 0912   BP: 104/66   Pulse: 95     General: NAD  Abdominal exam: The abdomen is soft and non-tender without guarding or rebound. The incisions appear to be healing well without erythema, edema, or infection.  Lower quadrant incision well-healed, no hematoma, small area of induration underneath, likely resolving hematoma.  No drainage.      Assessment: 19 y.o. female here for postop visit    Plan:  - Doing well, meeting milestones  - Discussed expectations for IUD bleeding pattern  - Follow-up as needed or at next annual exam      Katelyn Helm MD

## 2024-12-26 ENCOUNTER — APPOINTMENT (OUTPATIENT)
Dept: GYNECOLOGY | Facility: CLINIC | Age: 19
End: 2024-12-26
Payer: COMMERCIAL

## 2024-12-26 ENCOUNTER — GYNECOLOGY VISIT (OUTPATIENT)
Dept: GYNECOLOGY | Facility: CLINIC | Age: 19
End: 2024-12-26
Payer: COMMERCIAL

## 2024-12-26 VITALS
HEART RATE: 95 BPM | WEIGHT: 146 LBS | BODY MASS INDEX: 25.87 KG/M2 | HEIGHT: 63 IN | DIASTOLIC BLOOD PRESSURE: 66 MMHG | SYSTOLIC BLOOD PRESSURE: 104 MMHG

## 2024-12-26 DIAGNOSIS — Z98.890 POSTOPERATIVE STATE: ICD-10-CM

## 2024-12-26 PROCEDURE — 99024 POSTOP FOLLOW-UP VISIT: CPT | Performed by: STUDENT IN AN ORGANIZED HEALTH CARE EDUCATION/TRAINING PROGRAM

## 2024-12-26 PROCEDURE — 3074F SYST BP LT 130 MM HG: CPT | Performed by: STUDENT IN AN ORGANIZED HEALTH CARE EDUCATION/TRAINING PROGRAM

## 2024-12-26 PROCEDURE — 3078F DIAST BP <80 MM HG: CPT | Performed by: STUDENT IN AN ORGANIZED HEALTH CARE EDUCATION/TRAINING PROGRAM

## 2024-12-26 ASSESSMENT — FIBROSIS 4 INDEX: FIB4 SCORE: 0.29

## 2024-12-26 NOTE — PROGRESS NOTES
Pt here for post op visit.  Date of surgery: 12/10/24  Pt states her hematoma is hard and still hurts when they move around. Pt states they have been bleeding since the operation LMP:12/10/24 also states bleeding feels different than MP

## 2025-02-25 ENCOUNTER — APPOINTMENT (OUTPATIENT)
Dept: RADIOLOGY | Facility: MEDICAL CENTER | Age: 20
End: 2025-02-25
Attending: EMERGENCY MEDICINE
Payer: COMMERCIAL

## 2025-02-25 ENCOUNTER — HOSPITAL ENCOUNTER (OUTPATIENT)
Facility: MEDICAL CENTER | Age: 20
End: 2025-02-25
Attending: PHYSICIAN ASSISTANT
Payer: COMMERCIAL

## 2025-02-25 ENCOUNTER — OFFICE VISIT (OUTPATIENT)
Dept: URGENT CARE | Facility: PHYSICIAN GROUP | Age: 20
End: 2025-02-25
Payer: COMMERCIAL

## 2025-02-25 ENCOUNTER — HOSPITAL ENCOUNTER (EMERGENCY)
Facility: MEDICAL CENTER | Age: 20
End: 2025-02-25
Attending: EMERGENCY MEDICINE
Payer: COMMERCIAL

## 2025-02-25 VITALS
RESPIRATION RATE: 20 BRPM | OXYGEN SATURATION: 99 % | DIASTOLIC BLOOD PRESSURE: 72 MMHG | BODY MASS INDEX: 26.31 KG/M2 | SYSTOLIC BLOOD PRESSURE: 106 MMHG | WEIGHT: 148.48 LBS | TEMPERATURE: 97.9 F | HEART RATE: 104 BPM | HEIGHT: 63 IN

## 2025-02-25 VITALS
DIASTOLIC BLOOD PRESSURE: 67 MMHG | HEIGHT: 63 IN | HEART RATE: 93 BPM | OXYGEN SATURATION: 95 % | RESPIRATION RATE: 16 BRPM | SYSTOLIC BLOOD PRESSURE: 107 MMHG | TEMPERATURE: 98.2 F | BODY MASS INDEX: 26.37 KG/M2 | WEIGHT: 148.81 LBS

## 2025-02-25 DIAGNOSIS — R11.0 NAUSEA: ICD-10-CM

## 2025-02-25 DIAGNOSIS — R30.0 DYSURIA: ICD-10-CM

## 2025-02-25 DIAGNOSIS — N12 PYELONEPHRITIS: ICD-10-CM

## 2025-02-25 DIAGNOSIS — R10.84 GENERALIZED ABDOMINAL PAIN: ICD-10-CM

## 2025-02-25 DIAGNOSIS — R10.9 ACUTE ABDOMINAL PAIN: ICD-10-CM

## 2025-02-25 LAB
ALBUMIN SERPL BCP-MCNC: 4.7 G/DL (ref 3.2–4.9)
ALBUMIN/GLOB SERPL: 1.7 G/DL
ALP SERPL-CCNC: 95 U/L (ref 30–99)
ALT SERPL-CCNC: 15 U/L (ref 2–50)
AMBIGUOUS DTTM AMBI4: NORMAL
ANION GAP SERPL CALC-SCNC: 11 MMOL/L (ref 7–16)
APPEARANCE UR: NORMAL
AST SERPL-CCNC: 18 U/L (ref 12–45)
BACTERIA BLD CULT: NORMAL
BACTERIA BLD CULT: NORMAL
BASOPHILS # BLD AUTO: 0.2 % (ref 0–1.8)
BASOPHILS # BLD: 0.02 K/UL (ref 0–0.12)
BILIRUB SERPL-MCNC: 0.6 MG/DL (ref 0.1–1.5)
BILIRUB UR STRIP-MCNC: NEGATIVE MG/DL
BUN SERPL-MCNC: 7 MG/DL (ref 8–22)
CALCIUM ALBUM COR SERPL-MCNC: 8.7 MG/DL (ref 8.5–10.5)
CALCIUM SERPL-MCNC: 9.3 MG/DL (ref 8.4–10.2)
CHLORIDE SERPL-SCNC: 103 MMOL/L (ref 96–112)
CO2 SERPL-SCNC: 24 MMOL/L (ref 20–33)
COLOR UR AUTO: YELLOW
CREAT SERPL-MCNC: 0.8 MG/DL (ref 0.5–1.4)
EOSINOPHIL # BLD AUTO: 0.03 K/UL (ref 0–0.51)
EOSINOPHIL NFR BLD: 0.3 % (ref 0–6.9)
ERYTHROCYTE [DISTWIDTH] IN BLOOD BY AUTOMATED COUNT: 42.3 FL (ref 35.9–50)
GFR SERPLBLD CREATININE-BSD FMLA CKD-EPI: 107 ML/MIN/1.73 M 2
GLOBULIN SER CALC-MCNC: 2.8 G/DL (ref 1.9–3.5)
GLUCOSE SERPL-MCNC: 94 MG/DL (ref 65–99)
GLUCOSE UR STRIP.AUTO-MCNC: NEGATIVE MG/DL
HCG SERPL QL: NEGATIVE
HCT VFR BLD AUTO: 44.6 % (ref 37–47)
HGB BLD-MCNC: 14.7 G/DL (ref 12–16)
IMM GRANULOCYTES # BLD AUTO: 0.04 K/UL (ref 0–0.11)
IMM GRANULOCYTES NFR BLD AUTO: 0.4 % (ref 0–0.9)
KETONES UR STRIP.AUTO-MCNC: NEGATIVE MG/DL
LACTATE SERPL-SCNC: 0.7 MMOL/L (ref 0.5–2)
LEUKOCYTE ESTERASE UR QL STRIP.AUTO: NORMAL
LIPASE SERPL-CCNC: 35 U/L (ref 11–82)
LYMPHOCYTES # BLD AUTO: 1.56 K/UL (ref 1–4.8)
LYMPHOCYTES NFR BLD: 16.1 % (ref 22–41)
MCH RBC QN AUTO: 30.1 PG (ref 27–33)
MCHC RBC AUTO-ENTMCNC: 33 G/DL (ref 32.2–35.5)
MCV RBC AUTO: 91.4 FL (ref 81.4–97.8)
MONOCYTES # BLD AUTO: 0.79 K/UL (ref 0–0.85)
MONOCYTES NFR BLD AUTO: 8.1 % (ref 0–13.4)
NEUTROPHILS # BLD AUTO: 7.27 K/UL (ref 1.82–7.42)
NEUTROPHILS NFR BLD: 74.9 % (ref 44–72)
NITRITE UR QL STRIP.AUTO: NEGATIVE
NRBC # BLD AUTO: 0 K/UL
NRBC BLD-RTO: 0 /100 WBC (ref 0–0.2)
PH UR STRIP.AUTO: 7 [PH] (ref 5–8)
PLATELET # BLD AUTO: 315 K/UL (ref 164–446)
PMV BLD AUTO: 10.6 FL (ref 9–12.9)
POCT INT CON NEG: NEGATIVE
POCT INT CON POS: POSITIVE
POCT URINE PREGNANCY TEST: NEGATIVE
POTASSIUM SERPL-SCNC: 3.8 MMOL/L (ref 3.6–5.5)
PROT SERPL-MCNC: 7.5 G/DL (ref 6–8.2)
PROT UR QL STRIP: NEGATIVE MG/DL
RBC # BLD AUTO: 4.88 M/UL (ref 4.2–5.4)
RBC UR QL AUTO: NORMAL
SIGNIFICANT IND 70042: NORMAL
SIGNIFICANT IND 70042: NORMAL
SITE SITE: NORMAL
SITE SITE: NORMAL
SODIUM SERPL-SCNC: 138 MMOL/L (ref 135–145)
SOURCE SOURCE: NORMAL
SOURCE SOURCE: NORMAL
SP GR UR STRIP.AUTO: 1.02
UROBILINOGEN UR STRIP-MCNC: NORMAL MG/DL
WBC # BLD AUTO: 9.7 K/UL (ref 4.8–10.8)

## 2025-02-25 PROCEDURE — 85025 COMPLETE CBC W/AUTO DIFF WBC: CPT

## 2025-02-25 PROCEDURE — 83605 ASSAY OF LACTIC ACID: CPT

## 2025-02-25 PROCEDURE — 700105 HCHG RX REV CODE 258: Performed by: EMERGENCY MEDICINE

## 2025-02-25 PROCEDURE — 96374 THER/PROPH/DIAG INJ IV PUSH: CPT

## 2025-02-25 PROCEDURE — 87040 BLOOD CULTURE FOR BACTERIA: CPT

## 2025-02-25 PROCEDURE — 83690 ASSAY OF LIPASE: CPT

## 2025-02-25 PROCEDURE — 99285 EMERGENCY DEPT VISIT HI MDM: CPT

## 2025-02-25 PROCEDURE — 700111 HCHG RX REV CODE 636 W/ 250 OVERRIDE (IP): Mod: JZ | Performed by: EMERGENCY MEDICINE

## 2025-02-25 PROCEDURE — 74177 CT ABD & PELVIS W/CONTRAST: CPT

## 2025-02-25 PROCEDURE — 84703 CHORIONIC GONADOTROPIN ASSAY: CPT

## 2025-02-25 PROCEDURE — 96375 TX/PRO/DX INJ NEW DRUG ADDON: CPT

## 2025-02-25 PROCEDURE — 36415 COLL VENOUS BLD VENIPUNCTURE: CPT

## 2025-02-25 PROCEDURE — 87086 URINE CULTURE/COLONY COUNT: CPT

## 2025-02-25 PROCEDURE — 80053 COMPREHEN METABOLIC PANEL: CPT

## 2025-02-25 PROCEDURE — 700117 HCHG RX CONTRAST REV CODE 255: Performed by: EMERGENCY MEDICINE

## 2025-02-25 RX ORDER — HYDROMORPHONE HYDROCHLORIDE 1 MG/ML
1 INJECTION, SOLUTION INTRAMUSCULAR; INTRAVENOUS; SUBCUTANEOUS ONCE
Status: COMPLETED | OUTPATIENT
Start: 2025-02-25 | End: 2025-02-25

## 2025-02-25 RX ORDER — ONDANSETRON 2 MG/ML
4 INJECTION INTRAMUSCULAR; INTRAVENOUS ONCE
Status: COMPLETED | OUTPATIENT
Start: 2025-02-25 | End: 2025-02-25

## 2025-02-25 RX ORDER — SODIUM CHLORIDE, SODIUM LACTATE, POTASSIUM CHLORIDE, AND CALCIUM CHLORIDE .6; .31; .03; .02 G/100ML; G/100ML; G/100ML; G/100ML
1000 INJECTION, SOLUTION INTRAVENOUS ONCE
Status: COMPLETED | OUTPATIENT
Start: 2025-02-25 | End: 2025-02-25

## 2025-02-25 RX ORDER — ONDANSETRON 4 MG/1
4 TABLET, ORALLY DISINTEGRATING ORAL EVERY 8 HOURS PRN
Qty: 8 TABLET | Refills: 0 | Status: SHIPPED | OUTPATIENT
Start: 2025-02-25 | End: 2025-03-04

## 2025-02-25 RX ORDER — DICYCLOMINE HCL 20 MG
20 TABLET ORAL EVERY 6 HOURS
Qty: 30 TABLET | Refills: 0 | Status: SHIPPED | OUTPATIENT
Start: 2025-02-25 | End: 2025-03-05

## 2025-02-25 RX ORDER — SULFAMETHOXAZOLE AND TRIMETHOPRIM 800; 160 MG/1; MG/1
1 TABLET ORAL EVERY 12 HOURS
Qty: 20 TABLET | Refills: 0 | Status: SHIPPED | OUTPATIENT
Start: 2025-02-25 | End: 2025-03-07

## 2025-02-25 RX ORDER — ONDANSETRON 4 MG/1
4 TABLET, ORALLY DISINTEGRATING ORAL ONCE
Status: COMPLETED | OUTPATIENT
Start: 2025-02-25 | End: 2025-02-25

## 2025-02-25 RX ADMIN — SODIUM CHLORIDE, POTASSIUM CHLORIDE, SODIUM LACTATE AND CALCIUM CHLORIDE 1000 ML: 600; 310; 30; 20 INJECTION, SOLUTION INTRAVENOUS at 13:45

## 2025-02-25 RX ADMIN — ONDANSETRON 4 MG: 2 INJECTION INTRAMUSCULAR; INTRAVENOUS at 13:35

## 2025-02-25 RX ADMIN — ONDANSETRON 4 MG: 4 TABLET, ORALLY DISINTEGRATING ORAL at 10:51

## 2025-02-25 RX ADMIN — IOHEXOL 100 ML: 350 INJECTION, SOLUTION INTRAVENOUS at 15:07

## 2025-02-25 RX ADMIN — HYDROMORPHONE HYDROCHLORIDE 1 MG: 1 INJECTION, SOLUTION INTRAMUSCULAR; INTRAVENOUS; SUBCUTANEOUS at 13:35

## 2025-02-25 ASSESSMENT — ENCOUNTER SYMPTOMS
BACK PAIN: 1
VOMITING: 0
NAUSEA: 1
FLANK PAIN: 1
FEVER: 0
CHILLS: 1
MYALGIAS: 1
DIARRHEA: 0
ABDOMINAL PAIN: 1

## 2025-02-25 ASSESSMENT — FIBROSIS 4 INDEX
FIB4 SCORE: 0.29
FIB4 SCORE: 0.29

## 2025-02-25 NOTE — DISCHARGE INSTRUCTIONS
Please discuss the following findings with your regular doctor:  CT-ABDOMEN-PELVIS WITH   Final Result      1.  No evidence of bowel obstruction or focal inflammatory change.      2.  IUD within the endometrial cavity.      3.  Small amount of free fluid within the pelvis.        Labs Reviewed   CBC WITH DIFFERENTIAL - Abnormal; Notable for the following components:       Result Value    Neutrophils-Polys 74.90 (*)     Lymphocytes 16.10 (*)     All other components within normal limits   COMP METABOLIC PANEL - Abnormal; Notable for the following components:    Bun 7 (*)     All other components within normal limits   LACTIC ACID   LIPASE   HCG QUAL SERUM   ESTIMATED GFR   URINALYSIS   URINE CULTURE(NEW)   BLOOD CULTURE   BLOOD CULTURE

## 2025-02-25 NOTE — ED PROVIDER NOTES
ER Provider Note    Scribed for Kelby Su M.d. by Candy Song. 2/25/2025  1:15 PM    Primary Care Provider: MODESTA Wang    CHIEF COMPLAINT   Chief Complaint   Patient presents with    Epigastric Pain     HX of exploratory abd surgery in December. Described as sharp pain persistent. Onset 4 days ago. Paired with nausea, lack of appetite, and some emesis.      HPI/ROS  LIMITATION TO HISTORY   Select: : None  OUTSIDE HISTORIAN(S):  None.    Nancy Rodrigues is a 19 y.o. person who presents to the ED for evaluation of constant epigastric pain onset 4 days ago. The patient describes the pain as sharp and like a tightness. They also note that it is worse in the right upper quadrant. When the patient sits down or lays down, they report that the pain improves slightly, but they state it is still present. The patient states they also have had nausea, vomiting, bilateral flank pain and dysuria, but denies any hematemesis, abnormal vaginal discharge, or diarrhea. The patient reports they have had about 5 episodes of emesis within these past 4 days. The patient's last bowel movement was yesterday. They deny any recent increase in use of Tylenol or Motrin. No recent accident or trauma. The patient denies alcohol use. The patient does mention that they had an exploratory abdominal surgery in December, but denies any history of pancreatitis. No new medications have been started recently. The patient states that they smoke marijuana, but denies any use of stimulants.     PAST MEDICAL HISTORY  Past Medical History:   Diagnosis Date    Anesthesia     PONV     Anxiety     Depression     Environmental and seasonal allergies     History of migraine headaches        SURGICAL HISTORY  Past Surgical History:   Procedure Laterality Date    MA LAP,DIAGNOSTIC ABDOMEN  12/10/2024    Procedure: DIAGNOSTIC LAPAROPSCOPY;  Surgeon: Katelyn Helm M.D.;  Location: SURGERY [facility];  Service: Gynecology     INSERTION OR REMOVAL, IUD  12/10/2024    Procedure: INTRAUTERINE DEVICE PLACEMENT;  Surgeon: Katelyn Helm M.D.;  Location: SURGERY [facility];  Service: Gynecology    PB REMOVAL DEEP IMPLANT Left 10/24/2022    Procedure: LEFT FOOT REMOVAL INTERNAL FIXATION;  Surgeon: Moy Yost M.D.;  Location: SURGERY [facility];  Service: Orthopedics    PB FUSION FOOT BONE,MIDTARSAL,1 JT Left 10/24/2022    Procedure: FUSION -Lapidus/Midfoot Fusion;  Surgeon: Moy Yost M.D.;  Location: SURGERY [facility];  Service: Orthopedics    BUNIONECTOMY Left 12/18/2017    Procedure: BUNIONECTOMY/FOR REPAIR OF HALLUX VALGUS;  Surgeon: Moy Yost M.D.;  Location: SURGERY [facility];  Service: Orthopedics    ORTHOPEDIC OSTEOTOMY Left 12/18/2017    Procedure: ORTHOPEDIC OSTEOTOMY/AND DELIA;  Surgeon: Moy Yost M.D.;  Location: SURGERY [facility];  Service: Orthopedics    ORTHOPEDIC OSTEOTOMY Right 11/13/2017    Procedure: ORTHOPEDIC OSTEOTOMY-Proximal with bone grafting;  Surgeon: Moy Yost M.D.;  Location: SURGERY [facility];  Service: Orthopedics    BUNIONECTOMY Right 11/13/2017    Procedure: BUNIONECTOMY - HALLUX VALGUS CORRECTION WITH DELIA;  Surgeon: Moy Yost M.D.;  Location: [facility];  Service: Orthopedics    OTHER      T&A - about 4 years old.       FAMILY HISTORY  Family History   Problem Relation Age of Onset    Hypertension Father     Breast Cancer Maternal Grandmother     Uterine cancer Maternal Grandmother     Cancer Maternal Grandfather     Breast Cancer Paternal Grandmother        SOCIAL HISTORY   reports that Nancy has never smoked. Nancy has never used smokeless tobacco. Nancy reports current alcohol use. Nancy reports current drug use. Drugs: Inhaled and Marijuana.      CURRENT MEDICATIONS  Discharge Medication List as of 2/25/2025  4:05 PM        CONTINUE these medications which have NOT CHANGED    Details   !! acetaminophen (TYLENOL) 500 MG Tab Take  "1,000 mg by mouth every 6 hours as needed for Moderate Pain., Historical Med      !! acetaminophen (TYLENOL) 500 MG Tab Take 1 Tablet by mouth every 6 hours as needed for Mild Pain or Moderate Pain (acute post operative pain)., Disp-30 Tablet, R-0, Normal      propranolol (INDERAL) 10 MG Tab Take 10 mg by mouth 1 time a day as needed (anxiety)., Historical Med      DULoxetine (CYMBALTA) 20 MG Cap DR Particles Take 20 mg by mouth every day., Historical Med      traZODone (DESYREL) 50 MG Tab Take 50 mg by mouth every evening., Historical Med      SUMAtriptan (IMITREX) 50 MG Tab Take 1 Tablet by mouth one time as needed for Migraine for up to 1 dose., Disp-9 Tablet, R-3, Normal       !! - Potential duplicate medications found. Please discuss with provider.          ALLERGIES  Amoxicillin      PHYSICAL EXAM  BP (!) 130/90   Pulse (!) 110   Temp 37 °C (98.6 °F) (Temporal)   Resp 18   Ht 1.6 m (5' 3\")   Wt 67.5 kg (148 lb 13 oz)   LMP 02/18/2025 (Approximate)   SpO2 95%   BMI 26.36 kg/m²     Constitutional: Alert in mild distress.  HENT: No signs of trauma, Bilateral external ears normal, Nose normal. Uvula midline.   Eyes: Pupils are equal and reactive, Conjunctiva normal, Non-icteric.   Neck: Normal range of motion, No tenderness, Supple, No stridor.   Lymphatic: No lymphadenopathy noted.   Cardiovascular: Regular rate and rhythm, no murmurs.   Thorax & Lungs: Normal breath sounds, No respiratory distress, No wheezing, No chest tenderness.   Abdomen: Bowel sounds normal, Soft, Right upper quadrant tenderness greater than left upper and left lower abdominal tenderness. No peritoneal signs, No masses, No pulsatile masses.   Skin: Warm, Dry, No erythema, No rash.   Back: No bony tenderness, No CVA tenderness.   Extremities: Intact distal pulses, No edema, No tenderness, No cyanosis.  Musculoskeletal: Good range of motion in all major joints. No tenderness to palpation or major deformities noted.   Neurologic: Alert " , Normal motor function, Normal sensory function, No focal deficits noted.   Psychiatric: Affect normal, Judgment normal, Mood normal.        DIAGNOSTIC STUDIES    LABS  Labs Reviewed   CBC WITH DIFFERENTIAL - Abnormal; Notable for the following components:       Result Value    Neutrophils-Polys 74.90 (*)     Lymphocytes 16.10 (*)     All other components within normal limits   COMP METABOLIC PANEL - Abnormal; Notable for the following components:    Bun 7 (*)     All other components within normal limits   LACTIC ACID   BLOOD CULTURE   BLOOD CULTURE   LIPASE   HCG QUAL SERUM   ESTIMATED GFR   URINALYSIS   URINE CULTURE(NEW)        COURSE & MEDICAL DECISION MAKING     ASSESSMENT, COURSE AND PLAN  Care Narrative: This is a 19 year old person who presents to the emergency department for evaluation of constant epigastric pain that started 4 days ago. Pain is worse to the right upper quadrant in comparison to the left upper and lower quadrant.     1:15 PM - Patient seen and examined at bedside. Discussed plan of care, including performing lab work. Patient agrees to the plan of care. The patient will be resuscitated with 1L LR IV and medicated with Dilaudid 1 mg and Zofran 4 mg. Ordered for HCG Qual Serum, Lipase, Blood Culture x2, Urine Culture, UA, CMP, CBC w/ Diff., and Lactic Acid to evaluate Nancy's symptoms.      2:26 PM - Ordered CT-Abdomen-Pelvis w/ for further evaluation.       #abdominal pain    CT scan of abdomen ordered in order to rule out SBO vs perf abscess    No RLQ pain, TTP or fever to suggest appendicitis  No LLQ pain or TTP or fever to suggest diverticulitis  No pain out of proportion to suggest mesenteric ischemia  No e/o rash or zoster      No e/o UTI  No elevation in lipase to suggest pancreatitis      4:02 PM - The patient was reevaluated at bedside. Patient is feeling improved. Lab work and imaging were unremarkable. Recommended the patient come back for fever, intractable nausea or other  emergent concerns. She will be discharged with Bentyl for at home pain management, as well as Zofran for nausea management. Discussed discharge instructions and return precautions with the patient and they were cleared for discharge. Patient was given the opportunity to ask any further questions. She is comfortable with discharge at this time.       Hydration: Based on the patient's presentation of Acute Vomiting and Other GI Upset the patient was given IV fluids. IV Hydration was used because oral hydration was not adequate alone. Upon recheck following hydration, the patient was improved.      DISPOSITION AND DISCUSSIONS  I have discussed management of the patient with the following physicians and BRITTNEE's:  None.    Discussion of management with other [facility] or appropriate source(s): None     The patient will return for new or worsening symptoms and is stable at the time of discharge.    DISPOSITION:  Patient will be discharged home in stable condition.    FOLLOW UP:  Clark Stahl A.P.R.N.  [address]  [facility] [id number] [phone]    In 3 days      [facility], Emergency Dept  [address]  [facility] [address] [phone]    If symptoms worsen    [facility]  [address]  [facility] [address] [phone]  In 1 week  call to schedule follow up      OUTPATIENT MEDICATIONS:  New Prescriptions    DICYCLOMINE (BENTYL) 20 MG TAB    Take 1 Tablet by mouth every 6 hours for 8 days.    ONDANSETRON (ZOFRAN ODT) 4 MG TABLET DISPERSIBLE    Take 1 Tablet by mouth every 8 hours as needed for Nausea/Vomiting for up to 7 days.      FINAL DIAGNOSIS  1. Acute abdominal pain      Candy CISNEROS (Scryoselyn), am scribing for, and in the presence of, Kelby Su M.D..    Electronically signed by: Candy Song (Penelope), 2/25/2025    IKelby M.D. personally performed the services described in this  documentation, as scribed by Candy Song in my presence, and it is both accurate and complete.      The note accurately reflects work and decisions made by me.  Kelby Su M.D.  2/25/2025  10:16 PM

## 2025-02-25 NOTE — ED TRIAGE NOTES
"Patient presents to the ER with the following complaints:    Chief Complaint   Patient presents with    Epigastric Pain     HX of exploratory abd surgery in December. Described as sharp pain persistent. Onset 4 days ago. Paired with nausea, lack of appetite, and some emesis.        BP (!) 130/90   Pulse (!) 110   Temp 37 °C (98.6 °F) (Temporal)   Resp 18   Ht 1.6 m (5' 3\")   Wt 67.5 kg (148 lb 13 oz)   LMP 02/18/2025 (Approximate)   SpO2 95%   BMI 26.36 kg/m²     "

## 2025-02-25 NOTE — ED NOTES
PIV placed by GEORGE Montelongo, blood work including 1st set of blood cultures collected and sent to lab;    Pt stated that she did a urinalysis at , unable to provide sample at this time;    Pt medicated per MAR;    Pt denied having any other needs at this time;

## 2025-02-25 NOTE — PROGRESS NOTES
Subjective     Nancy Rodrigues is a 19 y.o. person who presents with Abdominal Pain (Upper middle stomach pain that radiates down and into the lower back, pain started 4 days ago causing nausea. )            Patient is a 19-year-old female presents to urgent care with upper abdominal pain for the last 3 to 4 days.  Since then patient has developed diffuse abdominal pain now extending to her bilateral back since yesterday.  Patient does report associated nausea-and developed dysuria yesterday.  Patient reports decreased appetite since yesterday as well due to nausea.  She did have a normal bowel movement yesterday.  She does discuss recent surgery in Dec.  Of which she developed a hematoma at that time however reports that her abdominal discomfort is different.  She is currently sexually active-uncertain of last menstrual cycle as she currently has IUD currently denies any vaginal bleeding or rash.  She does report prior vaginal discharge however none at this time.    Abdominal Pain  This is a new problem. The current episode started in the past 7 days. The onset quality is gradual. The problem occurs constantly. The problem has been gradually worsening since onset. The pain is located in the generalized abdominal region. The pain is at a severity of 7/10. The pain is moderate. Associated symptoms include dysuria, myalgias and nausea. Pertinent negatives include no diarrhea, fever, frequency, hematuria, rash or vomiting.       Review of Systems   Constitutional:  Positive for chills and malaise/fatigue. Negative for fever.   Gastrointestinal:  Positive for abdominal pain and nausea. Negative for diarrhea and vomiting.   Genitourinary:  Positive for dysuria and flank pain. Negative for frequency, hematuria and urgency.   Musculoskeletal:  Positive for back pain and myalgias.   Skin:  Negative for rash.              Objective     /72   Pulse (!) 104   Temp 36.6 °C (97.9 °F) (Temporal)   Resp 20   Ht 1.6 m  "(5' 3\")   Wt 67.4 kg (148 lb 7.7 oz)   LMP 02/18/2025 (Approximate)   SpO2 99%   BMI 26.30 kg/m²    PMH:  has a past medical history of Anesthesia, Anxiety, Depression, Environmental and seasonal allergies, and History of migraine headaches.  MEDS: Reviewed .   ALLERGIES:   Allergies   Allergen Reactions    Amoxicillin Swelling     Patient experienced swelling of the lips     SURGHX:   Past Surgical History:   Procedure Laterality Date    ND LAP,DIAGNOSTIC ABDOMEN  12/10/2024    Procedure: DIAGNOSTIC LAPAROPSCOPY;  Surgeon: Katelyn Helm M.D.;  Location: [facility];  Service: Gynecology    INSERTION OR REMOVAL, IUD  12/10/2024    Procedure: INTRAUTERINE DEVICE PLACEMENT;  Surgeon: Katelyn Helm M.D.;  Location: [facility];  Service: Gynecology    PB REMOVAL DEEP IMPLANT Left 10/24/2022    Procedure: LEFT FOOT REMOVAL INTERNAL FIXATION;  Surgeon: Moy Yost M.D.;  Location: [facility];  Service: Orthopedics    PB FUSION FOOT BONE,MIDTARSAL,1 JT Left 10/24/2022    Procedure: FUSION -Lapidus/Midfoot Fusion;  Surgeon: Moy Yost M.D.;  Location: [facility];  Service: Orthopedics    BUNIONECTOMY Left 12/18/2017    Procedure: BUNIONECTOMY/FOR REPAIR OF HALLUX VALGUS;  Surgeon: Moy Yost M.D.;  Location: [facility];  Service: Orthopedics    ORTHOPEDIC OSTEOTOMY Left 12/18/2017    Procedure: ORTHOPEDIC OSTEOTOMY/AND DELIA;  Surgeon: Moy Yost M.D.;  Location: [facility];  Service: Orthopedics    ORTHOPEDIC OSTEOTOMY Right 11/13/2017    Procedure: ORTHOPEDIC OSTEOTOMY-Proximal with bone grafting;  Surgeon: Moy Yost M.D.;  Location: [facility];  Service: Orthopedics    BUNIONECTOMY Right 11/13/2017    Procedure: BUNIONECTOMY - HALLUX VALGUS CORRECTION WITH DELIA;  Surgeon: Moy Yost M.D.;  Location: [facility];  Service: Orthopedics    OTHER      T&A - about 4 years old.     SOCHX:  reports " that Nancy has never smoked. Nancy has never used smokeless tobacco. Nancy reports current alcohol use. Nancy reports current drug use. Drugs: Inhaled and Marijuana.  FH: Family history was reviewed, no pertinent findings to report  Headache thank things out yeah  Physical Exam  Vitals reviewed.   Constitutional:       General: Nancy is not in acute distress.     Appearance: Nancy is well-developed.   HENT:      Head: Normocephalic and atraumatic.   Eyes:      Conjunctiva/sclera: Conjunctivae normal.      Pupils: Pupils are equal, round, and reactive to light.   Neck:      Trachea: No tracheal deviation.   Cardiovascular:      Rate and Rhythm: Normal rate.   Pulmonary:      Effort: Pulmonary effort is normal.   Abdominal:      Tenderness: There is abdominal tenderness. There is guarding. There is no rebound.          Comments:   Patient diffusely tender to the epigastrium, right upper quadrant and lower abdomen.  Patient is with positive CVAT bilaterally.   Musculoskeletal:      Cervical back: Normal range of motion and neck supple.      Comments: Moves all extremities   Skin:     General: Skin is warm.      Findings: No rash.      Comments: No rash to area exposed during the visit today.    Neurological:      Mental Status: Nancy is alert and oriented to person, place, and time.      Coordination: Coordination normal.   Psychiatric:         Mood and Affect: Mood normal.         Behavior: Behavior normal.                                  Assessment & Plan  Dysuria    Orders:    POCT Urinalysis    URINE CULTURE(NEW); Future    POCT PREGNANCY    Pyelonephritis    Orders:    URINE CULTURE(NEW); Future    sulfamethoxazole-trimethoprim (BACTRIM DS) 800-160 MG tablet; Take 1 Tablet by mouth every 12 hours for 10 days.    Nausea    Orders:    ondansetron (Zofran ODT) dispertab 4 mg    Generalized abdominal pain              hCG-negative.  Urinalysis with large leukocyte Estrace and moderate blood..    I do feel that  patient in the least is with pyelonephritis at this time we will start the patient on the above will defer ceftriaxone as it does appear that patient is with amoxicillin allergy with throat swelling and hives.  However patient is diffusely tender to the abdomen of which originally noted pain prior to UTI-like symptoms suspect that these may be unrelated at this time.  I do feel that patient needs abdominal workup at this time and due to potential delay in diagnosis patient was sent to the emergency room for timely workup.  Patient was to remain n.p.o. at this time she was given Zofran in clinic today.  Patient left in stable condition for further evaluation in the ED today with her boyfriend she is uncertain which ED she will present to but is agreeable to go.    Please note that this dictation was created using voice recognition software. I have made every reasonable attempt to correct obvious errors, but I expect that there are errors of grammar and possibly content that I did not discover before finalizing the note.

## 2025-02-26 NOTE — ED NOTES
Patient is stable for discharge at this time, anticipatory guidance provided, close follow-up is encouraged, and ED return instructions have been detailed. Patient and friend are both agreeable to the disposition and plan and discharged home in ambulatory state and in good condition.     Rx education provided, Pt verbalized understanding;

## 2025-02-27 ENCOUNTER — RESULTS FOLLOW-UP (OUTPATIENT)
Dept: URGENT CARE | Facility: CLINIC | Age: 20
End: 2025-02-27

## 2025-02-27 LAB
BACTERIA UR CULT: NORMAL
SIGNIFICANT IND 70042: NORMAL
SITE SITE: NORMAL
SOURCE SOURCE: NORMAL

## 2025-02-28 ENCOUNTER — APPOINTMENT (OUTPATIENT)
Dept: MEDICAL GROUP | Facility: PHYSICIAN GROUP | Age: 20
End: 2025-02-28
Payer: COMMERCIAL

## 2025-03-02 LAB
BACTERIA BLD CULT: NORMAL
BACTERIA BLD CULT: NORMAL
SIGNIFICANT IND 70042: NORMAL
SIGNIFICANT IND 70042: NORMAL
SITE SITE: NORMAL
SITE SITE: NORMAL
SOURCE SOURCE: NORMAL
SOURCE SOURCE: NORMAL

## 2025-03-24 ENCOUNTER — APPOINTMENT (OUTPATIENT)
Dept: MEDICAL GROUP | Facility: PHYSICIAN GROUP | Age: 20
End: 2025-03-24
Payer: COMMERCIAL

## 2025-03-25 ENCOUNTER — APPOINTMENT (OUTPATIENT)
Dept: MEDICAL GROUP | Facility: PHYSICIAN GROUP | Age: 20
End: 2025-03-25
Payer: COMMERCIAL

## (undated) DEVICE — TROCAR 5X100 SEPARTATOR ADV - FIXATION (6/BX)

## (undated) DEVICE — CANISTER SUCTION 3000ML MECHANICAL FILTER AUTO SHUTOFF MEDI-VAC NONSTERILE LF DISP (40EA/CA)

## (undated) DEVICE — SYRINGE STERILE 10 ML LL (200/BX)

## (undated) DEVICE — ELECTRODE DUAL RETURN W/ CORD - (50/PK)

## (undated) DEVICE — Device

## (undated) DEVICE — BLADE SURGICAL #15 - (50/BX 3BX/CA)

## (undated) DEVICE — TUBING CLEARLINK DUO-VENT - C-FLO (48EA/CA)

## (undated) DEVICE — GLOVE BIOGEL ECLIPSE PF LATEX SIZE 8.0  (50PR/BX)

## (undated) DEVICE — BAG SPONGE COUNT 10.25 X 32 - BLUE (250/CA)

## (undated) DEVICE — GLOVE BIOGEL PI INDICATOR SZ 7.0 SURGICAL PF LF - (50/BX 4BX/CA)

## (undated) DEVICE — SYRINGE 10 ML CONTROL LL (25EA/BX 4BX/CA)

## (undated) DEVICE — CANISTER SUCTION RIGID RED 1500CC (40EA/CA)

## (undated) DEVICE — DRAPE 36X28IN RAD CARM BND BG - (25/CA) O

## (undated) DEVICE — SUCTION INSTRUMENT YANKAUER BULBOUS TIP W/O VENT (50EA/CA)

## (undated) DEVICE — DRAPE U ORTHOPEDIC - (10/BX)

## (undated) DEVICE — NEEDLE INSFL 120MM 14GA VRRS - (20/BX)

## (undated) DEVICE — SUTURE 3-0 VICRYL PLUS SH - 8X 18 INCH (12/BX)

## (undated) DEVICE — CONTAINER, SPECIMEN, STERILE

## (undated) DEVICE — CANISTER SUCTION 3000ML MECHANICAL FILTER AUTO SHUTOFF MEDI-VAC NONSTERILE LF DISP  (40EA/CA)

## (undated) DEVICE — SENSOR SPO2 NEO LNCS ADHESIVE (20/BX) SEE USER NOTES

## (undated) DEVICE — SET TUBING PNEUMOCLEAR HIGH FLOW SMOKE EVACUATION (10EA/BX)

## (undated) DEVICE — GLOVE SZ 7 BIOGEL PI MICRO - PF LF (50PR/BX 4BX/CA)

## (undated) DEVICE — DRAPE STRLE REG TOWEL 18X24 - (10/BX 4BX/CA)"

## (undated) DEVICE — KIT ROOM DECONTAMINATION

## (undated) DEVICE — TUBE E-T HI-LO CUFF 7.0MM (10EA/PK)

## (undated) DEVICE — PADDING CAST 4 IN STERILE - 4 X 4 YDS (24/CA)

## (undated) DEVICE — MASK ANESTHESIA ADULT  - (100/CA)

## (undated) DEVICE — PAD LAP STERILE 18 X 18 - (5/PK 40PK/CA)

## (undated) DEVICE — SUTURE 4-0 MONOCRYL PLUS PS-2 - 27 INCH (36/BX)

## (undated) DEVICE — SODIUM CHL IRRIGATION 0.9% 1000ML (12EA/CA)

## (undated) DEVICE — SENSOR OXIMETER ADULT SPO2 RD SET (20EA/BX)

## (undated) DEVICE — SUTURE GENERAL

## (undated) DEVICE — PACK TOTAL KNEE  (1/CA)

## (undated) DEVICE — SUTURE 3-0 ETHILON FS-1 - (36/BX) 30 INCH

## (undated) DEVICE — DRESSING 3X8 ADAPTIC GAUZE - NON-ADHERING (36/PK 6PK/BX)

## (undated) DEVICE — DRAPE C-ARM LARGE 41IN X 74 IN - (10/BX 2BX/CA)

## (undated) DEVICE — TOWEL STOP TIMEOUT SAFETY FLAG (40EA/CA)

## (undated) DEVICE — KIT  I.V. START (100EA/CA)

## (undated) DEVICE — GLOVE SZ 6 BIOGEL PI MICRO - PF LF (50PR/BX 4BX/CA)

## (undated) DEVICE — PACK LOWER EXTREMITY - (2/CA)

## (undated) DEVICE — CHLORAPREP 26 ML APPLICATOR - ORANGE TINT(25/CA)

## (undated) DEVICE — PAD OR TABLE DA VINCI 2IN X 20IN X 72IN - (12EA/CA)

## (undated) DEVICE — SET EXTENSION WITH 2 PORTS (48EA/CA) ***PART #2C8610 IS A SUBSTITUTE*****

## (undated) DEVICE — GLOVE BIOGEL SZ 7 SURGICAL PF LTX - (50PR/BX 4BX/CA)

## (undated) DEVICE — LACTATED RINGERS INJ 1000 ML - (14EA/CA 60CA/PF)

## (undated) DEVICE — GLOVE BIOGEL ECLIPSE  PF LATEX SIZE 6.5 (50PR/BX)

## (undated) DEVICE — GOWN SURGEONS X-LARGE - DISP. (30/CA)

## (undated) DEVICE — PEN SKIN MARKER W/RULER - (50EA/BX)

## (undated) DEVICE — GLOVE BIOGEL PI INDICATOR SZ 6.5 SURGICAL PF LF - (50/BX 4BX/CA)

## (undated) DEVICE — BANDAGE ELASTIC 4 HONEYCOMB - 4"X5YD LF (20/CA)"

## (undated) DEVICE — PROTECTOR ULNA NERVE - (36PR/CA)

## (undated) DEVICE — GLOVE BIOGEL PI INDICATOR SZ 8.0 SURGICAL PF LF -(50/BX 4BX/CA)

## (undated) DEVICE — GOWN SURGEONS LARGE - (32/CA)

## (undated) DEVICE — COVER LIGHT HANDLE ALC PLUS DISP (18EA/BX)

## (undated) DEVICE — NEEDLE NON SAFETY HYPO 22 GA X 1 1/2 IN (100/BX)

## (undated) DEVICE — SUTURE 0 VICRYL PLUS CT-2 - 8 X 18 INCH (12/BX)

## (undated) DEVICE — NEPTUNE 4 PORT MANIFOLD - (20/PK)

## (undated) DEVICE — TOWELS CLOTH SURGICAL - (4/PK 20PK/CA)

## (undated) DEVICE — HEAD HOLDER JUNIOR/ADULT

## (undated) DEVICE — TRAY SRGPRP PVP IOD WT PRP - (20/CA)

## (undated) DEVICE — SUTURE 3-0 ETHILON PS-1 (36PK/BX)

## (undated) DEVICE — UTERINE MANIP V-CARE STANDARD DAVINCI (8EA/CA)

## (undated) DEVICE — GLOVE SZ 7.5 BIOGEL PI MICRO - PF LF (50PR/BX)

## (undated) DEVICE — ELECTRODE 850 FOAM ADHESIVE - HYDROGEL RADIOTRNSPRNT (50/PK)

## (undated) DEVICE — PADDING CAST 6 IN STERILE - 6 X 4 YDS (24/CA)

## (undated) DEVICE — GLOVE BIOGEL INDICATOR SZ 7.5 SURGICAL PF LTX - (50PR/BX 4BX/CA)

## (undated) DEVICE — GOWN WARMING STANDARD FLEX - (30/CA)

## (undated) DEVICE — SLEEVE, VASO, THIGH, MED

## (undated) DEVICE — BLADE SURGICAL #11 - (50/BX)

## (undated) DEVICE — BANDAGE ELASTIC 6 HONEYCOMB - 6X5YD LF (20/CA)"

## (undated) DEVICE — GLOVE BIOGEL INDICATOR SZ 6.5 SURGICAL PF LTX - (50PR/BX 4BX/CA)

## (undated) DEVICE — CATHETER IV 20 GA X 1-1/4 ---SURG.& SDS ONLY--- (50EA/BX, 4BX/CA)

## (undated) DEVICE — DRAPE LARGE 3 QUARTER - (20/CA)

## (undated) DEVICE — ANTI-FOG SOLUTION - 60BTL/CA

## (undated) DEVICE — SET LEADWIRE 5 LEAD BEDSIDE DISPOSABLE ECG (1SET OF 5/EA)

## (undated) DEVICE — CANNULA O2 COMFORT SOFT EAR ADULT 7 FT TUBING (50/CA)

## (undated) DEVICE — WATER IRRIG. STER. 1500 ML - (9/CA)

## (undated) DEVICE — GLOVE SZ 6.5 BIOGEL PI MICRO - PF LF (50PR/BX)

## (undated) DEVICE — MASK OXYGEN VNYL ADLT MED CONC WITH 7 FOOT TUBING - (50EA/CA)

## (undated) DEVICE — SLEEVE VASO DVT COMPRESSION CALF MED - (10PR/CA)

## (undated) DEVICE — DRESSING ABDOMINAL PAD STERILE 8 X 10" (360EA/CA)"

## (undated) DEVICE — ELECTRODE 5MM LHK LAPSCP STERILE DISP- MEGADYNE (5/CA)

## (undated) DEVICE — DRILL BIT 2.5MM X 60MM

## (undated) DEVICE — GLOVE BIOGEL PI ORTHO SZ 6 1/2 SURGICAL PF LF (40PR/BX)

## (undated) DEVICE — GLOVE BIOGEL SZ 7.5 SURGICAL PF LTX - (50PR/BX 4BX/CA)

## (undated) DEVICE — DERMABOND ADVANCED - (12EA/BX)

## (undated) DEVICE — SPONGE GAUZESTER 4 X 4 4PLY - (128PK/CA)

## (undated) DEVICE — KIT ANESTHESIA W/CIRCUIT & 3/LT BAG W/FILTER (20EA/CA)

## (undated) DEVICE — DRAPE MAYO STAND - (30/CA)

## (undated) DEVICE — WRAP COBAN SELF-ADHERENT 6 IN X  5YDS STERILE TAN (12/CA)

## (undated) DEVICE — ARMREST CRADLE FOAM - (2PR/PK 12PR/CA)

## (undated) DEVICE — 2.0MM DRILL BIT

## (undated) DEVICE — BLOCK

## (undated) DEVICE — SLEEVE VASO CALF MED - (10PR/CA)

## (undated) DEVICE — TRAY CATHETER FOLEY URINE METER W/STATLOCK 350ML (10EA/CA)

## (undated) DEVICE — MASK AIRWAY SIZE 3 UNIQUE SILICON (10/BX)

## (undated) DEVICE — TUBE CONNECTING SUCTION - CLEAR PLASTIC STERILE 72 IN (50EA/CA)

## (undated) DEVICE — GLOVE BIOGEL PI ORTHO SZ 8 PF LF (40PR/BX)